# Patient Record
Sex: FEMALE | Race: WHITE | Employment: FULL TIME | URBAN - METROPOLITAN AREA
[De-identification: names, ages, dates, MRNs, and addresses within clinical notes are randomized per-mention and may not be internally consistent; named-entity substitution may affect disease eponyms.]

---

## 2019-09-26 ENCOUNTER — HOSPITAL ENCOUNTER (OUTPATIENT)
Dept: MRI IMAGING | Age: 49
Discharge: HOME | End: 2019-09-26
Payer: COMMERCIAL

## 2019-09-26 DIAGNOSIS — M75.112 INCOMPLETE TEAR OF LEFT ROTATOR CUFF: ICD-10-CM

## 2019-09-26 PROCEDURE — 73221 MRI JOINT UPR EXTREM W/O DYE: CPT

## 2022-07-22 ENCOUNTER — OFFICE VISIT (OUTPATIENT)
Dept: ORTHOPEDIC SURGERY | Age: 52
End: 2022-07-22
Payer: COMMERCIAL

## 2022-07-22 DIAGNOSIS — M19.011 ARTHRITIS OF RIGHT ACROMIOCLAVICULAR JOINT: ICD-10-CM

## 2022-07-22 DIAGNOSIS — G89.29 CHRONIC RIGHT SHOULDER PAIN: ICD-10-CM

## 2022-07-22 DIAGNOSIS — M75.101 TEAR OF RIGHT ROTATOR CUFF, UNSPECIFIED TEAR EXTENT, UNSPECIFIED WHETHER TRAUMATIC: Primary | ICD-10-CM

## 2022-07-22 DIAGNOSIS — M75.41 SHOULDER IMPINGEMENT, RIGHT: ICD-10-CM

## 2022-07-22 DIAGNOSIS — M25.511 CHRONIC RIGHT SHOULDER PAIN: ICD-10-CM

## 2022-07-22 PROBLEM — M25.512 CHRONIC LEFT SHOULDER PAIN: Status: ACTIVE | Noted: 2022-07-22

## 2022-07-22 PROCEDURE — 99204 OFFICE O/P NEW MOD 45 MIN: CPT | Performed by: ORTHOPAEDIC SURGERY

## 2022-07-22 NOTE — PROGRESS NOTES
Macy ALBERT (: 1970) is a 54 y.o. female, patient, here for evaluation of the following chief complaint(s):  Shoulder Pain (right)       HPI:    She began having increased right shoulder and arm pain approximately 2 weeks ago. The patient reports no specific injury and states the pain came on gradually. She describes her discomfort as moderate, sharp, throbbing, aching, and constant. The patient's pain does make it difficult for her to go to sleep and does wake her up from sleep. The patient has been experiencing some swelling and weakness in her shoulder and upper extremity. She states that her pain continues to get worse. She reports that walking and lifting make her pain worse and nothing seems to make her pain better. She has tried prednisone for her discomfort. The patient does report a previous injection in her right shoulder. The patient has been to formal therapy in the past.  She was not seen in the emergency room for her right shoulder or arm pain and reports no previous or related surgical intervention. Right shoulder x-rays from an outside facility were independently reviewed and they show no evidence of a fracture or dislocation. Evidence of impingement and acromioclavicular arthritis. Glenohumeral joint space is maintained. No Known Allergies    No current outpatient medications on file. No current facility-administered medications for this visit. History reviewed. No pertinent past medical history. History reviewed. No pertinent surgical history. History reviewed. No pertinent family history.      Social History     Socioeconomic History    Marital status:      Spouse name: Not on file    Number of children: Not on file    Years of education: Not on file    Highest education level: Not on file   Occupational History    Not on file   Tobacco Use    Smoking status: Not on file    Smokeless tobacco: Not on file   Substance and Sexual Activity Alcohol use: Not on file    Drug use: Not on file    Sexual activity: Not on file   Other Topics Concern    Not on file   Social History Narrative    Not on file     Social Determinants of Health     Financial Resource Strain: Not on file   Food Insecurity: Not on file   Transportation Needs: Not on file   Physical Activity: Not on file   Stress: Not on file   Social Connections: Not on file   Intimate Partner Violence: Not on file   Housing Stability: Not on file       Review of Systems   All other systems reviewed and are negative. Vitals:  Ht 5' 4\" (1.626 m)   Wt 213 lb (96.6 kg)   BMI 36.56 kg/m²    Body mass index is 36.56 kg/m². Ortho Exam     The patient is well-developed and well-nourished. The patient presents today in alert and oriented x3 with a normal mood and affect. The patient stands with a normal weightbearing line and walks with a normal gait. Left shoulder: No shoulder girdle atrophy. There is no soft tissue swelling, ecchymosis, abrasions, or lacerations. Active range of motion of the shoulder is limited to 130 degrees of forward flexion, 120 degrees of lateral abduction, and 70 degrees of external rotation. Internal rotation is to the SI joint and is painful. Passive range of motion is full with a painful impingement sign and painful Boggs sign. Rotator cuff strength is painful to evaluate and is a 4+/5 with forward flexion and lateral abduction. External rotation strength is maintained. There is no crepitation about the joint. Palpation of the [facility] joint does reproduce discomfort and there is pain elicited with cross body abduction. Strength of the extremity is 5/5 strength at biceps/triceps/wrist extension and is comparable to the contralateral side. DTRs are intact at +2/4 and are symmetrical.  Cervical range of motion is full with no pain to palpation along the paraspinal musculature medial border of the scapula. Spurling's sign is negative. ASSESSMENT/PLAN:      1. Tear of right rotator cuff, unspecified tear extent, unspecified whether traumatic  -     MRI SHOULDER RT WO CONT; Future  2. Chronic right shoulder pain  -     MRI SHOULDER RT WO CONT; Future  3. Shoulder impingement, right  4. Arthritis of right acromioclavicular joint     Below is the assessment and plan developed based on review of pertinent history, physical exam, labs, studies, and medications. We discussed the patient's ongoing right shoulder pain and her signs, symptoms, physical exam, description of her pain, and x-rays performed at [facility] that were independently reviewed were consistent with a rotator cuff tear, impingement, and acromioclavicular arthritis. The possible treatment options were discussed with the patient and because of the duration of her increased pain, no improvement with multiple modalities of conservative management including both formal physical therapy and an at-home exercise program, her physical exam, description of her pain, independently reviewed x-rays from an outside facility, and her inability to complete daily living activities without significant discomfort we elected to obtain an MRI of her right shoulder to further evaluate the severity of her rotator cuff tear, impingement, and acromioclavicular arthritis. The MRI images and results will be used in preoperative planning if and almost certainly when surgical intervention is necessary. The risks and benefits of the MRI were discussed in detail with the patient and she would like to proceed. We will schedule this at her convenience. I will see her back after MRI is complete to discuss the images, results, and further treatment options. In the interim, I did encourage her to ice when possible, modify her activity level based on her right shoulder pain, and use anti-inflammatory medication when necessary.   The patient will work on range of motion, strengthening, and stretching exercises with an at-home exercise program as pain tolerates. I will see her back as noted above after right shoulder MRI is complete. **We will obtain an MRI for more information to determine the best treatment plan moving forward and help us prepare for surgical intervention if necessary. **    Return for After her right shoulder MRI is complete. An electronic signature was used to authenticate this note.   -- Deannie Sandhoff, MD

## 2022-07-25 VITALS — WEIGHT: 213 LBS | BODY MASS INDEX: 36.37 KG/M2 | HEIGHT: 64 IN

## 2022-07-30 ENCOUNTER — HOSPITAL ENCOUNTER (EMERGENCY)
Age: 52
Discharge: HOME | End: 2022-07-30
Attending: EMERGENCY MEDICINE
Payer: COMMERCIAL

## 2022-07-30 VITALS
DIASTOLIC BLOOD PRESSURE: 87 MMHG | HEART RATE: 88 BPM | RESPIRATION RATE: 16 BRPM | SYSTOLIC BLOOD PRESSURE: 143 MMHG | HEIGHT: 64 IN | WEIGHT: 213 LBS | OXYGEN SATURATION: 97 % | BODY MASS INDEX: 36.37 KG/M2 | TEMPERATURE: 98.1 F

## 2022-07-30 DIAGNOSIS — L03.211 FACIAL CELLULITIS: Primary | ICD-10-CM

## 2022-07-30 LAB
ALBUMIN SERPL-MCNC: 3.1 G/DL (ref 3.5–5)
ALBUMIN/GLOB SERPL: 0.8 {RATIO} (ref 1.1–2.2)
ALP SERPL-CCNC: 14 U/L (ref 45–117)
ALT SERPL-CCNC: 34 U/L (ref 12–78)
ANION GAP SERPL CALC-SCNC: 8 MMOL/L (ref 5–15)
AST SERPL-CCNC: 34 U/L (ref 15–37)
BASOPHILS # BLD: 0.1 K/UL (ref 0–0.1)
BASOPHILS NFR BLD: 1 % (ref 0–1)
BILIRUB SERPL-MCNC: 0.7 MG/DL (ref 0.2–1)
BUN SERPL-MCNC: 16 MG/DL (ref 6–20)
BUN/CREAT SERPL: 12 (ref 12–20)
CALCIUM SERPL-MCNC: 8.8 MG/DL (ref 8.5–10.1)
CHLORIDE SERPL-SCNC: 110 MMOL/L (ref 97–108)
CO2 SERPL-SCNC: 23 MMOL/L (ref 21–32)
CREAT SERPL-MCNC: 1.36 MG/DL (ref 0.55–1.02)
DIFFERENTIAL METHOD BLD: ABNORMAL
EOSINOPHIL # BLD: 0.1 K/UL (ref 0–0.4)
EOSINOPHIL NFR BLD: 2 % (ref 0–7)
ERYTHROCYTE [DISTWIDTH] IN BLOOD BY AUTOMATED COUNT: 14.5 % (ref 11.5–14.5)
GLOBULIN SER CALC-MCNC: 4 G/DL (ref 2–4)
GLUCOSE SERPL-MCNC: 109 MG/DL (ref 65–100)
HCT VFR BLD AUTO: 41.9 % (ref 35–47)
HGB BLD-MCNC: 13.5 G/DL (ref 11.5–16)
IMM GRANULOCYTES # BLD AUTO: 0.1 K/UL (ref 0–0.04)
IMM GRANULOCYTES NFR BLD AUTO: 1 % (ref 0–0.5)
LYMPHOCYTES # BLD: 1.9 K/UL (ref 0.8–3.5)
LYMPHOCYTES NFR BLD: 32 % (ref 12–49)
MCH RBC QN AUTO: 29.5 PG (ref 26–34)
MCHC RBC AUTO-ENTMCNC: 32.2 G/DL (ref 30–36.5)
MCV RBC AUTO: 91.7 FL (ref 80–99)
MONOCYTES # BLD: 0.8 K/UL (ref 0–1)
MONOCYTES NFR BLD: 13 % (ref 5–13)
NEUTS SEG # BLD: 3.1 K/UL (ref 1.8–8)
NEUTS SEG NFR BLD: 51 % (ref 32–75)
NRBC # BLD: 0 K/UL (ref 0–0.01)
NRBC BLD-RTO: 0 PER 100 WBC
PLATELET # BLD AUTO: 167 K/UL (ref 150–400)
PMV BLD AUTO: 9.2 FL (ref 8.9–12.9)
POTASSIUM SERPL-SCNC: 4 MMOL/L (ref 3.5–5.1)
PROT SERPL-MCNC: 7.1 G/DL (ref 6.4–8.2)
RBC # BLD AUTO: 4.57 M/UL (ref 3.8–5.2)
SODIUM SERPL-SCNC: 141 MMOL/L (ref 136–145)
WBC # BLD AUTO: 6.1 K/UL (ref 3.6–11)

## 2022-07-30 PROCEDURE — 99283 EMERGENCY DEPT VISIT LOW MDM: CPT

## 2022-07-30 PROCEDURE — 36415 COLL VENOUS BLD VENIPUNCTURE: CPT

## 2022-07-30 PROCEDURE — 80053 COMPREHEN METABOLIC PANEL: CPT

## 2022-07-30 PROCEDURE — 85025 COMPLETE CBC W/AUTO DIFF WBC: CPT

## 2022-07-30 NOTE — DISCHARGE INSTRUCTIONS
Continue to take antibiotic as prescribed. Continue to monitor symptoms at home. Take Advil or Tylenol as needed for pain. Follow-up with PCP and return with any changes or worsening.

## 2022-07-30 NOTE — ED TRIAGE NOTES
Pt reports 2 days ago a \"bump\" was coming up on left side of face/cheek with redness and swelling. Was placed on antibiotics yesterday for cellulitis. Feels swelling has gotten worse and came to ER for evaluation for allergic reaction. Pt denies SOB.

## 2022-07-30 NOTE — ED PROVIDER NOTES
53-year-old female with no significant past medical history presents to ED with 2 days of facial redness and swelling. Patient reports that about 3 days ago she had a \"acne-like bump\" on her left cheek that she put toothpaste on and then picked at. The next day she noticed some redness and swelling surrounding this bump. [facility] was diagnosed with cellulitis and put on doxycycline. She has taken 2 doses of this but feels that she is not getting any better and called [facility] back. They encouraged her to go to the ED. She denies any fevers, chills, vision changes, dental pain, otalgia, nasal congestion, dysphagia. The history is provided by the patient. Skin Infection  Pertinent negatives include no chest pain, no headaches and no shortness of breath. No past medical history on file. No past surgical history on file. No family history on file. Social History     Socioeconomic History    Marital status:      Spouse name: Not on file    Number of children: Not on file    Years of education: Not on file    Highest education level: Not on file   Occupational History    Not on file   Tobacco Use    Smoking status: Not on file    Smokeless tobacco: Not on file   Substance and Sexual Activity    Alcohol use: Not on file    Drug use: Not on file    Sexual activity: Not on file   Other Topics Concern    Not on file   Social History Narrative    Not on file     Social Determinants of Health     Financial Resource Strain: Not on file   Food Insecurity: Not on file   Transportation Needs: Not on file   Physical Activity: Not on file   Stress: Not on file   Social Connections: Not on file   Intimate Partner Violence: Not on file   Housing Stability: Not on file         ALLERGIES: Ciprofloxacin, Rocephin [ceftriaxone], and Sulfa (sulfonamide antibiotics)    Review of Systems   Constitutional:  Negative for fever. HENT:  Negative for congestion and sinus pressure. Respiratory:  Negative for shortness of breath. Cardiovascular:  Negative for chest pain. Gastrointestinal:  Negative for nausea and vomiting. Genitourinary:  Negative for dysuria. Musculoskeletal:  Negative for myalgias. Skin:  Positive for color change. Neurological:  Negative for dizziness and headaches. Hematological:  Negative for adenopathy. Psychiatric/Behavioral:  The patient is not nervous/anxious. All other systems reviewed and are negative. Vitals:    07/30/22 1737   BP: (!) 143/87   Pulse: 88   Resp: 16   Temp: 98.1 °F (36.7 °C)   SpO2: 97%   Weight: 96.6 kg (213 lb)   Height: 5' 4\" (1.626 m)            Physical Exam  Vitals and nursing note reviewed. Constitutional:       General: She is not in acute distress. Appearance: Normal appearance. She is normal weight. HENT:      Head: Normocephalic and atraumatic. Eyes:      Extraocular Movements: Extraocular movements intact. Pupils: Pupils are equal, round, and reactive to light. Cardiovascular:      Rate and Rhythm: Normal rate and regular rhythm. Heart sounds: Normal heart sounds. Pulmonary:      Breath sounds: Normal breath sounds. Abdominal:      Palpations: Abdomen is soft. Tenderness: There is no abdominal tenderness. Lymphadenopathy:      Cervical: No cervical adenopathy. Skin:     General: Skin is warm and dry. Findings: Erythema (surrounding wound) and lesion (small wound to left cheek with surrounding erythema and swelling) present. Neurological:      General: No focal deficit present. Mental Status: She is alert and oriented to person, place, and time. Psychiatric:         Mood and Affect: Mood normal.         Behavior: Behavior normal.         Thought Content:  Thought content normal.        MDM  Number of Diagnoses or Management Options  Facial cellulitis  Diagnosis management comments: 53-year-old female with no significant past medical history presents to ED with 2 days of facial redness and swelling. Vital signs stable in triage and patient is afebrile. Physical exam notable for small wound to left cheek with surrounding erythema and swelling consistent with cellulitis. Labs revealed no acute abnormalities and no elevated WBC count. Patient likely has not taken doxycycline long enough to notice a difference. No indication to change antibiotic at this point. Patient will be discharged with instructions to continue taking doxycycline and will be given instructions for conservative care and return precautions.        Amount and/or Complexity of Data Reviewed  Clinical lab tests: reviewed           Procedures

## 2022-08-08 ENCOUNTER — OFFICE VISIT (OUTPATIENT)
Dept: ORTHOPEDIC SURGERY | Age: 52
End: 2022-08-08
Payer: COMMERCIAL

## 2022-08-08 VITALS — HEIGHT: 64 IN | BODY MASS INDEX: 36.56 KG/M2

## 2022-08-08 DIAGNOSIS — M75.31 CALCIFIC TENDINITIS OF RIGHT SHOULDER: ICD-10-CM

## 2022-08-08 DIAGNOSIS — M19.011 ARTHRITIS OF RIGHT ACROMIOCLAVICULAR JOINT: ICD-10-CM

## 2022-08-08 DIAGNOSIS — M75.41 SHOULDER IMPINGEMENT, RIGHT: ICD-10-CM

## 2022-08-08 DIAGNOSIS — M25.511 CHRONIC RIGHT SHOULDER PAIN: Primary | ICD-10-CM

## 2022-08-08 DIAGNOSIS — G89.29 CHRONIC RIGHT SHOULDER PAIN: Primary | ICD-10-CM

## 2022-08-08 DIAGNOSIS — M67.813 BICEPS TENDINOSIS OF RIGHT SHOULDER: ICD-10-CM

## 2022-08-08 PROCEDURE — 99214 OFFICE O/P EST MOD 30 MIN: CPT | Performed by: ORTHOPAEDIC SURGERY

## 2022-08-08 NOTE — PROGRESS NOTES
Macy ALBERT (: 1970) is a 54 y.o. female, patient, here for evaluation of the following chief complaint(s):  Shoulder Pain (Right, MRI results)       HPI:    She was last seen for right shoulder pain on 2022. Since then, the patient did have an MRI performed on her right shoulder on 2022. The patient states that her pain levels gotten worse since her last visit. She rates the severity of her right shoulder pain as a 6 out of 10. She describes her pain as sharp, stabbing, and constant. Her right shoulder pain does make difficult for her to go to sleep and does wake her up from sleep. The patient reports taking no medication for discomfort. Right shoulder MRI images and results were independently reviewed and they were consistent with interval resolution of calcific tendinitis without substantial residual signal abnormality. Mild diffuse rotator cuff tendinopathy. No full-thickness or partial-thickness tendon tear noted. Mild grade 1 tendinosis of the long head of the biceps tendon and rotator interval.  Type II-3 acromion with mild subacromial spurring. No substantial AC joint or glenohumeral joint osteoarthritis noted. Allergies   Allergen Reactions    Ciprofloxacin Itching    Rocephin [Ceftriaxone] Itching    Sulfa (Sulfonamide Antibiotics) Itching       No current outpatient medications on file. No current facility-administered medications for this visit. History reviewed. No pertinent past medical history. History reviewed. No pertinent surgical history. History reviewed. No pertinent family history.      Social History     Socioeconomic History    Marital status:      Spouse name: Not on file    Number of children: Not on file    Years of education: Not on file    Highest education level: Not on file   Occupational History    Not on file   Tobacco Use    Smoking status: Not on file    Smokeless tobacco: Not on file   Substance and Sexual Activity Alcohol use: Not on file    Drug use: Not on file    Sexual activity: Not on file   Other Topics Concern    Not on file   Social History Narrative    Not on file     Social Determinants of Health     Financial Resource Strain: Not on file   Food Insecurity: Not on file   Transportation Needs: Not on file   Physical Activity: Not on file   Stress: Not on file   Social Connections: Not on file   Intimate Partner Violence: Not on file   Housing Stability: Not on file       Review of Systems   All other systems reviewed and are negative. Vitals:  Ht 5' 4\" (1.626 m)   BMI 36.56 kg/m²    Body mass index is 36.56 kg/m². Ortho Exam     The patient is well-developed and well-nourished. The patient presents today in alert and oriented x3 with a normal mood and affect. The patient stands with a normal weightbearing line and walks with a normal gait. Left shoulder: No shoulder girdle atrophy. There is no soft tissue swelling, ecchymosis, abrasions, or lacerations. Active range of motion of the shoulder is limited to 130 degrees of forward flexion, 120 degrees of lateral abduction, and 70 degrees of external rotation. Internal rotation is to the SI joint and is painful. Passive range of motion is full with a painful impingement sign and painful Boggs sign. Rotator cuff strength is painful to evaluate and is a 4+/5 with forward flexion and lateral abduction. External rotation strength is maintained. There is no crepitation about the joint. Palpation of the [facility] joint does reproduce discomfort and there is pain elicited with cross body abduction. Strength of the extremity is 5/5 strength at biceps/triceps/wrist extension and is comparable to the contralateral side. DTRs are intact at +2/4 and are symmetrical.  Cervical range of motion is full with no pain to palpation along the paraspinal musculature medial border of the scapula. Spurling's sign is negative. ASSESSMENT/PLAN:      1.  Chronic right shoulder pain  2. Shoulder impingement, right  3. Arthritis of right acromioclavicular joint  4. Calcific tendinitis of right shoulder  5. Biceps tendinosis of right shoulder       Below is the assessment and plan developed based on review of pertinent history, physical exam, labs, studies, and medications. We discussed the patient's ongoing and worsening right shoulder pain and we independently reviewed her MRI images and results and they were consistent with interval resolution of calcific tendinitis without substantial residual signal abnormality. Mild diffuse rotator cuff tendinopathy. No full-thickness or partial-thickness tendon tear noted. Mild grade 1 tendinosis of the long head of the biceps tendon and rotator interval.  Type II-3 acromion with mild subacromial spurring. No substantial AC joint or glenohumeral joint osteoarthritis noted. Her physical exam is consistent with acromioclavicular joint osteoarthritis. The possible treatment options were discussed with the patient and because of the duration of her increased pain, no improvement with multiple modalities of conservative management including an at-home exercise program, her physical exam, description of her pain, past x-rays, independently reviewed MRI images and results, and her inability to complete daily living activities without significant discomfort we both decided that surgical intervention was the best treatment plan. The risks and benefits of a right shoulder arthroscopic exam with acromioplasty, distal clavicle resection, extensive debridement, and open biceps tenodesis were discussed in detail with the patient and she would like to proceed. We will schedule at her convenience. In the interim, I did encourage her to ice when possible, modify her activity level based on her right shoulder pain, and use anti-inflammatory medication when necessary.   The patient also work on range of motion, strengthening, and stretching exercises with an at-home exercise program as pain tolerates. I will see her back in the office on an as-needed basis or on the day of her upcoming right shoulder surgery. Return for In the office as needed or on the day of her upcoming right shoulder surgery. An electronic signature was used to authenticate this note.   -- Roma Layton MD

## 2022-08-09 DIAGNOSIS — M75.41 SHOULDER IMPINGEMENT, RIGHT: Primary | ICD-10-CM

## 2022-08-09 DIAGNOSIS — M19.011 ARTHRITIS OF RIGHT ACROMIOCLAVICULAR JOINT: ICD-10-CM

## 2022-08-09 DIAGNOSIS — M67.813 BICEPS TENDINOSIS OF RIGHT SHOULDER: ICD-10-CM

## 2022-08-24 ENCOUNTER — DOCUMENTATION ONLY (OUTPATIENT)
Dept: ORTHOPEDIC SURGERY | Age: 52
End: 2022-08-24

## 2022-08-26 DIAGNOSIS — M67.813 BICEPS TENDINOSIS OF RIGHT SHOULDER: ICD-10-CM

## 2022-08-26 DIAGNOSIS — M19.011 ARTHRITIS OF RIGHT ACROMIOCLAVICULAR JOINT: ICD-10-CM

## 2022-08-26 DIAGNOSIS — M75.101 TEAR OF RIGHT ROTATOR CUFF, UNSPECIFIED TEAR EXTENT, UNSPECIFIED WHETHER TRAUMATIC: ICD-10-CM

## 2022-08-26 DIAGNOSIS — M75.41 SHOULDER IMPINGEMENT, RIGHT: Primary | ICD-10-CM

## 2022-08-29 RX ORDER — OXYCODONE AND ACETAMINOPHEN 5; 325 MG/1; MG/1
1 TABLET ORAL
Qty: 40 TABLET | Refills: 0 | Status: SHIPPED | OUTPATIENT
Start: 2022-08-29 | End: 2022-09-05

## 2022-09-06 ENCOUNTER — OFFICE VISIT (OUTPATIENT)
Dept: ORTHOPEDIC SURGERY | Age: 52
End: 2022-09-06
Payer: COMMERCIAL

## 2022-09-06 DIAGNOSIS — M25.511 ACUTE POSTOPERATIVE PAIN OF RIGHT SHOULDER: Primary | ICD-10-CM

## 2022-09-06 DIAGNOSIS — G89.18 ACUTE POSTOPERATIVE PAIN OF RIGHT SHOULDER: Primary | ICD-10-CM

## 2022-09-06 PROCEDURE — 99024 POSTOP FOLLOW-UP VISIT: CPT | Performed by: ORTHOPAEDIC SURGERY

## 2022-09-06 NOTE — PROGRESS NOTES
Macy ALBERT (: 1970) is a 54 y.o. female, patient, here for evaluation of the following chief complaint(s):  Shoulder Pain (right) and Surgical Follow-up (Sx 22)       HPI:  Patient returns for her first postoperative visit following right shoulder arthroscopic extensive debridement, subacromial decompression, distal clavicle excision, and open biceps tenodesis. Her surgery was performed on 2022. She currently patient rates her pain as 4 out of 10. Describes it as throbbing and comes and goes and wakes her up from sleep. She is taking Percocet postoperatively as needed. Overall doing well and no major issues since surgery. Allergies   Allergen Reactions    Ciprofloxacin Itching    Rocephin [Ceftriaxone] Itching    Sulfa (Sulfonamide Antibiotics) Itching       No current outpatient medications on file. No current facility-administered medications for this visit. No past medical history on file. No past surgical history on file. No family history on file. Social History     Socioeconomic History    Marital status:      Spouse name: Not on file    Number of children: Not on file    Years of education: Not on file    Highest education level: Not on file   Occupational History    Not on file   Tobacco Use    Smoking status: Not on file    Smokeless tobacco: Not on file   Substance and Sexual Activity    Alcohol use: Not on file    Drug use: Not on file    Sexual activity: Not on file   Other Topics Concern    Not on file   Social History Narrative    Not on file     Social Determinants of Health     Financial Resource Strain: Not on file   Food Insecurity: Not on file   Transportation Needs: Not on file   Physical Activity: Not on file   Stress: Not on file   Social Connections: Not on file   Intimate Partner Violence: Not on file   Housing Stability: Not on file       Review of Systems    Vitals: There were no vitals taken for this visit.    There is no height or weight on file to calculate BMI. Ortho Exam     The patient is well-developed and well-nourished. The patient presents today in alert and oriented x3 with a normal mood and affect. The patient stands with a normal weightbearing line and walks with a normal gait. ASSESSMENT/PLAN:      1. Acute postoperative pain of right shoulder     Below is the assessment and plan developed based on review of pertinent history, physical exam, labs, studies, and medications. The patient sutures removed today in office under sterile conditions. We taught her home exercises to work on range of motion passively. She will return the office in 3 weeks to assess her range of motion progress if she is not progressing well we will initiate formalized physical therapy at this time. Return in about 3 weeks (around 9/27/2022). An electronic signature was used to authenticate this note.   -- Aleja Ruiz MD

## 2022-09-27 ENCOUNTER — OFFICE VISIT (OUTPATIENT)
Dept: ORTHOPEDIC SURGERY | Age: 52
End: 2022-09-27
Payer: COMMERCIAL

## 2022-09-27 VITALS — HEIGHT: 64 IN | BODY MASS INDEX: 36.37 KG/M2 | WEIGHT: 213 LBS

## 2022-09-27 DIAGNOSIS — Z98.890 STATUS POST SHOULDER SURGERY: ICD-10-CM

## 2022-09-27 DIAGNOSIS — M25.511 PAIN OF RIGHT SHOULDER REGION: Primary | ICD-10-CM

## 2022-09-27 PROCEDURE — 99024 POSTOP FOLLOW-UP VISIT: CPT | Performed by: ORTHOPAEDIC SURGERY

## 2022-09-27 NOTE — PROGRESS NOTES
Macy ALBERT (: 1970) is a 54 y.o. female, patient, here for evaluation of the following chief complaint(s):  Shoulder Pain (Right ) and Surgical Follow-up (Sx 22)       HPI:    She is now approximately 4 weeks status post right shoulder arthroscopic extensive debridement, subacromial decompression, distal clavicle excision, and open biceps tenodesis. Her surgery was performed on 2022. He was last seen on 2022. The patient states that her pain level is improved since her last visit and surgical procedure. She rates the severity of her postoperative right shoulder pain is a 3 out of 10. She describes her pain as throbbing and intermittent. Her postoperative right shoulder pain does still occasionally wake her up from sleep at night. She has been taking narcotic pain medication for her discomfort at night to help her sleep. The patient has been working on range of motion, strengthening, and stretching exercises post-operatively. Allergies   Allergen Reactions    Ciprofloxacin Itching    Rocephin [Ceftriaxone] Itching    Sulfa (Sulfonamide Antibiotics) Itching       No current outpatient medications on file. No current facility-administered medications for this visit. History reviewed. No pertinent past medical history. History reviewed. No pertinent surgical history. History reviewed. No pertinent family history.      Social History     Socioeconomic History    Marital status:      Spouse name: Not on file    Number of children: Not on file    Years of education: Not on file    Highest education level: Not on file   Occupational History    Not on file   Tobacco Use    Smoking status: Never    Smokeless tobacco: Never   Substance and Sexual Activity    Alcohol use: Not on file    Drug use: Not on file    Sexual activity: Not on file   Other Topics Concern    Not on file   Social History Narrative    Not on file     Social Determinants of Health Financial Resource Strain: Not on file   Food Insecurity: Not on file   Transportation Needs: Not on file   Physical Activity: Not on file   Stress: Not on file   Social Connections: Not on file   Intimate Partner Violence: Not on file   Housing Stability: Not on file       Review of Systems   All other systems reviewed and are negative. Vitals:  Ht 5' 4\" (1.626 m)   Wt 213 lb (96.6 kg)   BMI 36.56 kg/m²    Body mass index is 36.56 kg/m². Ortho Exam     The patient is well-developed and well-nourished. The patient presents today in alert and oriented x3 with a normal mood and affect. The patient stands with a normal weightbearing line and walks with a normal gait. Right shoulder wounds clean and dry neurovascular intact. There is no ecchymosis or erythema. Her incisions are well-healed with no sign of irritation or infection and look normal.  She does have full elbow and wrist range of motion. Her shoulder range of motion has improved since her last visit. She has approximately 130 degrees of forward flexion and 80 degrees of abduction. Her strength is also improving. There still limitation in all planes of motion secondary to her postoperative discomfort. There is weakness noted compared to normal.  Her sensations are intact and pulses are 2+. Her skin is well-healed. ASSESSMENT/PLAN:      1. Pain of right shoulder region  2. Status post shoulder surgery  -     REFERRAL TO PHYSICAL THERAPY     Below is the assessment and plan developed based on review of pertinent history, physical exam, labs, studies, and medications. **The patient was referred to formal physical therapy. **    We discussed the patient's right shoulder arthroscopic extensive debridement, subacromial decompression, distal clavicle excision, and open biceps tenodesis. Her surgery was performed on 8/29/2022. She is now approximately 4 weeks status postsurgical intervention.   The patient continues to progress nicely in her recovery. Her pain is intermittent and well controlled. Her range of motion and strength both continue to improve. The patient will continue the postoperative protocol by being on range of motion, strengthening, and stretching exercises with both formal physical therapy and with an at-home exercise program as pain tolerates. She will continue to increase activities as tolerated and as discussed today in the office. She is still to be cautious with any significant power biceps activities, internal and external supination, and reaching behind her with her arm fully extended. I did encourage her to ice when possible, modify her activity level based on her postoperative right shoulder and arm pain, and use anti-inflammatory medication when necessary. I will see her back in 3 weeks for reevaluation and further discussion of the postoperative protocol. Return in about 3 weeks (around 10/18/2022) for Re-evaluation and further discussion of the postop protocol. An electronic signature was used to authenticate this note.   -- Roma Layton MD

## 2022-11-07 ENCOUNTER — OFFICE VISIT (OUTPATIENT)
Dept: ORTHOPEDIC SURGERY | Age: 52
End: 2022-11-07
Payer: COMMERCIAL

## 2022-11-07 DIAGNOSIS — M25.511 ACUTE POSTOPERATIVE PAIN OF RIGHT SHOULDER: Primary | ICD-10-CM

## 2022-11-07 DIAGNOSIS — M25.511 PAIN OF RIGHT SHOULDER REGION: ICD-10-CM

## 2022-11-07 DIAGNOSIS — G89.18 ACUTE POSTOPERATIVE PAIN OF RIGHT SHOULDER: Primary | ICD-10-CM

## 2022-11-07 DIAGNOSIS — Z98.890 STATUS POST SHOULDER SURGERY: ICD-10-CM

## 2022-11-07 PROCEDURE — 99024 POSTOP FOLLOW-UP VISIT: CPT | Performed by: ORTHOPAEDIC SURGERY

## 2022-11-07 RX ORDER — METHYLPREDNISOLONE 4 MG/1
TABLET ORAL
Qty: 1 DOSE PACK | Refills: 0 | Status: SHIPPED | OUTPATIENT
Start: 2022-11-07

## 2022-11-07 NOTE — PROGRESS NOTES
Macy ALBERT (: 1970) is a 50 y.o. female, patient, here for evaluation of the following chief complaint(s):  Shoulder Pain (right) and Surgical Follow-up (Sx 22)       HPI:    She is now approximately 10 weeks status post right shoulder arthroscopic extensive debridement, subacromial decompression, distal clavicle excision, and open biceps tenodesis. Her surgery was performed on 2022. She was last seen on 2022. The patient states that her pain level has gotten slightly worse since her last visit. She does report that she was doing very nicely postoperatively but her dog yanked on the leash and she has had increased discomfort since then. She rates the severity of her postoperative right shoulder pain as a 5 out of 10. She describes her pain as stabbing and constant. Her postoperative right shoulder pain does still wake her up from sleep at night. The patient has been taking Percocet for her discomfort as needed. She has been attending formal PT postoperatively. The patient has also been working on these exercises with an at-home exercise program.  She reports that the formal PT, home exercises, and medication have all helped reduce her discomfort. Allergies   Allergen Reactions    Ciprofloxacin Itching    Rocephin [Ceftriaxone] Itching    Sulfa (Sulfonamide Antibiotics) Itching       Current Outpatient Medications   Medication Sig    methylPREDNISolone (MEDROL DOSEPACK) 4 mg tablet Per dose pack instructions     No current facility-administered medications for this visit. History reviewed. No pertinent past medical history. History reviewed. No pertinent surgical history. History reviewed. No pertinent family history.      Social History     Socioeconomic History    Marital status:      Spouse name: Not on file    Number of children: Not on file    Years of education: Not on file    Highest education level: Not on file   Occupational History    Not on file   Tobacco Use    Smoking status: Never    Smokeless tobacco: Never   Substance and Sexual Activity    Alcohol use: Not on file    Drug use: Not on file    Sexual activity: Not on file   Other Topics Concern    Not on file   Social History Narrative    Not on file     Social Determinants of Health     Financial Resource Strain: Not on file   Food Insecurity: Not on file   Transportation Needs: Not on file   Physical Activity: Not on file   Stress: Not on file   Social Connections: Not on file   Intimate Partner Violence: Not on file   Housing Stability: Not on file       Review of Systems   All other systems reviewed and are negative. Vitals: There were no vitals taken for this visit. There is no height or weight on file to calculate BMI. Ortho Exam     The patient is well-developed and well-nourished. The patient presents today in alert and oriented x3 with a normal mood and affect. The patient stands with a normal weightbearing line and walks with a normal gait. Right shoulder wounds clean and dry neurovascular intact. There is no ecchymosis or erythema. Her incisions are well-healed with no sign of irritation or infection and look normal.  She does have full elbow and wrist range of motion. Her shoulder range of motion has been improving very nicely but after the dog yanked the leash her range of motion has decreased slightly. She has approximately 130 degrees of active forward flexion and 90 degrees of active abduction. There is limitation in all planes of motion secondary to her discomfort. Her strength continues to improve. There is weakness noted compared to normal.  Her sensations are intact and pulses are 2+. His skin is well-healed. ASSESSMENT/PLAN:      1. Acute postoperative pain of right shoulder  -     methylPREDNISolone (MEDROL DOSEPACK) 4 mg tablet; Per dose pack instructions, Normal, Disp-1 Dose Pack, R-0  2. Pain of right shoulder region  3.  Status post shoulder surgery  -     REFERRAL TO PHYSICAL THERAPY       Below is the assessment and plan developed based on review of pertinent history, physical exam, labs, studies, and medications. **The patient will continue attending formal physical therapy. **    We discussed the patient's right shoulder arthroscopic extensive debridement, subacromial decompression, distal clavicle excision, and open biceps tenodesis. Her surgery was performed on 8/29/2022. She is now approximately 10 weeks status postsurgical intervention. The patient has been progressing very nicely in her recovery but her dog yanked on the leash and she has had increased discomfort since then. The possible treatment options were discussed with the patient and we elected to treat her pain with rest, ice, activity modification, and a Medrol Dosepak. The patient was given a prescription for Medrol Dosepak which she will use as instructed. While taking her Medrol Dosepak, the patient will discontinue use of anti-inflammatory medication. Once her Medrol Dosepak is complete, the patient may resume anti-inflammatories at that time. She will also continue the postoperative protocol by working on range of motion, strengthening, and stretching exercises at both formal physical therapy and with an at-home exercise program as pain tolerates. She may continue to increase activities as tolerated and as discussed today in the office. The patient was given documentation to return to work 11/21/2022. She is to work 4 hours a day that week. She is to do no lifting over 20 pounds and no overhead work. I will see her back in 3 weeks for reevaluation and further discussion of the postoperative protocol and her work status and restrictions. Return in about 3 weeks (around 11/28/2022) for Reevaluation and further discussion of the postop protocol/her work status and restrictions. An electronic signature was used to authenticate this note.   -- Deniz Mcdaniel MD

## 2022-11-07 NOTE — LETTER
11/7/2022 10:24 AM    Ms. Sean Kiki [address] [facility] [id number]      To whom to may concern:     Gunner Darnell was seen in our office on 11/7/2022. Status and/or Restrictions    She  may return to work light duty beginning 11/21/2022    With the following restrictions:   No lifting above 20 pounds   No overhead work      Feel free to contact my office at [phone] if you have any questions.            Sincerely,        Joycelyn Wilder MD

## 2022-11-11 DIAGNOSIS — M25.511 ACUTE POSTOPERATIVE PAIN OF RIGHT SHOULDER: ICD-10-CM

## 2022-11-11 DIAGNOSIS — G89.18 ACUTE POSTOPERATIVE PAIN OF RIGHT SHOULDER: ICD-10-CM

## 2022-11-11 DIAGNOSIS — M75.41 SHOULDER IMPINGEMENT, RIGHT: Primary | ICD-10-CM

## 2022-11-11 RX ORDER — GABAPENTIN 300 MG/1
300 CAPSULE ORAL
Qty: 30 CAPSULE | Refills: 0 | Status: SHIPPED | OUTPATIENT
Start: 2022-11-11 | End: 2022-11-11 | Stop reason: CLARIF

## 2022-11-14 RX ORDER — GABAPENTIN 300 MG/1
300 CAPSULE ORAL
Qty: 30 CAPSULE | Refills: 0 | Status: SHIPPED | OUTPATIENT
Start: 2022-11-14

## 2022-11-16 ENCOUNTER — DOCUMENTATION ONLY (OUTPATIENT)
Dept: ORTHOPEDIC SURGERY | Age: 52
End: 2022-11-16

## 2022-11-28 ENCOUNTER — OFFICE VISIT (OUTPATIENT)
Dept: ORTHOPEDIC SURGERY | Age: 52
End: 2022-11-28
Payer: COMMERCIAL

## 2022-11-28 VITALS — HEIGHT: 63 IN | WEIGHT: 209 LBS | BODY MASS INDEX: 37.03 KG/M2

## 2022-11-28 DIAGNOSIS — Z98.890 STATUS POST SHOULDER SURGERY: ICD-10-CM

## 2022-11-28 DIAGNOSIS — M25.511 PAIN OF RIGHT SHOULDER REGION: Primary | ICD-10-CM

## 2022-11-28 PROCEDURE — 99024 POSTOP FOLLOW-UP VISIT: CPT | Performed by: ORTHOPAEDIC SURGERY

## 2022-11-28 NOTE — PROGRESS NOTES
Macy ALBERT (: 1970) is a 50 y.o. female, patient, here for evaluation of the following chief complaint(s):  Shoulder Pain (right) and Surgical Follow-up (Sx 2022)       HPI:    She is now approximately 13-week status post right shoulder arthroscopic extensive debridement, subacromial decompression, distal clavicle excision, and open biceps tenodesis. Her surgery was performed on 2022. She was last seen on 2022. The patient states that her pain level is the same as was her last visit. She rates the severity of her postoperative right shoulder pain is a 4 out of 10. She describes her pain as sharp and constant. Her postoperative right shoulder pain does make it difficult for her to go to sleep and does wake her up from sleep. The patient has been attending formal physical therapy postoperatively. She is also been working on these exercises with an at-home exercise program.  She reports that both the formal PT and home exercises have helped reduce her postoperative right shoulder pain. Allergies   Allergen Reactions    Ciprofloxacin Itching    Rocephin [Ceftriaxone] Itching    Sulfa (Sulfonamide Antibiotics) Itching       Current Outpatient Medications   Medication Sig    gabapentin (Neurontin) 300 mg capsule Take 1 Capsule by mouth nightly as needed for Pain. Max Daily Amount: 300 mg. Daily at Bedtime  Indications: acute pain following an operation    methylPREDNISolone (MEDROL DOSEPACK) 4 mg tablet Per dose pack instructions     No current facility-administered medications for this visit. History reviewed. No pertinent past medical history. History reviewed. No pertinent surgical history. History reviewed. No pertinent family history.      Social History     Socioeconomic History    Marital status:      Spouse name: Not on file    Number of children: Not on file    Years of education: Not on file    Highest education level: Not on file   Occupational History    Not on file   Tobacco Use    Smoking status: Never    Smokeless tobacco: Never   Substance and Sexual Activity    Alcohol use: Not on file    Drug use: Not on file    Sexual activity: Not on file   Other Topics Concern    Not on file   Social History Narrative    Not on file     Social Determinants of Health     Financial Resource Strain: Not on file   Food Insecurity: Not on file   Transportation Needs: Not on file   Physical Activity: Not on file   Stress: Not on file   Social Connections: Not on file   Intimate Partner Violence: Not on file   Housing Stability: Not on file       Review of Systems   All other systems reviewed and are negative. Vitals:  Ht 5' 3\" (1.6 m)   Wt 209 lb (94.8 kg)   BMI 37.02 kg/m²    Body mass index is 37.02 kg/m². Ortho Exam     The patient is well-developed and well-nourished. The patient presents today in alert and oriented x3 with a normal mood and affect. The patient stands with a normal weightbearing line and walks with a normal gait. Right shoulder wounds clean and dry neurovascular intact. There is no ecchymosis or erythema. Her incisions are well-healed with no sign of irritation or infection and look normal.  She does have full elbow and wrist range of motion. Her shoulder range of motion continues to improve. She has approximately 140 degrees of active forward flexion and 90 degrees of active abduction. She is still slightly stiffer than expected at this point in her recovery. Her strength continues to improve. There is still weakness noted compared to normal.  Her sensations are intact and pulses are 2+. Her skin is well-healed. ASSESSMENT/PLAN:      1. Pain of right shoulder region  2. Status post shoulder surgery  -     REFERRAL TO PHYSICAL THERAPY     Below is the assessment and plan developed based on review of pertinent history, physical exam, labs, studies, and medications.     **The patient will continue attending formal physical therapy. **    We discussed the patient's right shoulder arthroscopic extensive debridement, subacromial decompression, distal clavicle excision, and open biceps tenodesis. Her surgery was performed on 8/29/2022. She is now approximately 13 weeks status postsurgical intervention. The patient continues to progress in her recovery. Her pain is intermittent and well controlled. Her range of motion and strength both continue to improve. The patient will continue the postoperative protocol by working on range of motion, strengthening, and stretching exercises at both formal physical therapy and with an at-home exercise program as pain tolerates. She may continue to increase activities as tolerated and as discussed today in the office. I did encourage her to continue to ice when possible, modify her activity level based on her postoperative right shoulder pain, and use anti-inflammatory medication when necessary. I will see her back in 3 to 4 weeks for reevaluation and further discussion of the postoperative protocol. Return in about 3 weeks (around 12/19/2022) for Re-evaluation and further discussion of the postop protocol. An electronic signature was used to authenticate this note.   -- Ramsey Abbott MD

## 2023-02-22 ENCOUNTER — OFFICE VISIT (OUTPATIENT)
Dept: ORTHOPEDIC SURGERY | Age: 53
End: 2023-02-22
Payer: COMMERCIAL

## 2023-02-22 VITALS — HEIGHT: 63 IN | BODY MASS INDEX: 37.03 KG/M2 | WEIGHT: 209 LBS

## 2023-02-22 DIAGNOSIS — S92.022A CLOSED DISPLACED FRACTURE OF ANTERIOR PROCESS OF LEFT CALCANEUS, INITIAL ENCOUNTER: Primary | ICD-10-CM

## 2023-02-22 DIAGNOSIS — S99.922A FOOT INJURY, LEFT, INITIAL ENCOUNTER: ICD-10-CM

## 2023-02-22 RX ORDER — FLUOXETINE HYDROCHLORIDE 40 MG/1
CAPSULE ORAL
COMMUNITY

## 2023-02-22 NOTE — PROGRESS NOTES
Macy Ramirez (: 1970) is a 50 y.o. female, patient,here for evaluation of the following   Chief Complaint   Patient presents with    Foot Injury        ASSESSMENT/PLAN:  Below is the assessment and plan developed based on review of pertinent history, physical exam, labs, studies, and medications. 1. Closed displaced fracture of anterior process of left calcaneus, initial encounter  2. Foot injury, left, initial encounter    Patient verbalized understanding of exam findings and treatment plan. We engaged in the shared decision-making process and treatment options were discussed at length with the patient. Surgical and conservative management discussed today along with risk and benefits. Patient is informed of findings on exam and x-rays reviewed. Her fracture is at the distal anterior lateral part of the calcaneus and near the calcaneocuboid joint where there are avulsion fractures off the lateral cuboid and calcaneus. The joint is intact without dislocation or subluxation. No other fractures or dislocations seen. This should heal without any further problems, she will continue immobilization in the boot currently wearing. She can limit her weightbearing for the next 2 to 4 weeks to allow this to further heal and progress to full weightbearing as tolerated thereafter. Next time she returns we will get new x-rays of the right foot 3 views weightbearing. I discussed management options with the patient. All questions were answered to the best of my ability and to the patient's satisfaction. I discussed their history, symptoms, physical exam findings, diagnostic testing results, diagnosis and treatment options. The patient verbalized understanding and elected to proceed with conservative treatment as above. Return in about 6 weeks (around 2023) for repeat xrays.       Allergies   Allergen Reactions    Ciprofloxacin Itching    Rocephin [Ceftriaxone] Itching    Sulfa (Sulfonamide Antibiotics) Itching       Current Outpatient Medications   Medication Sig    FLUoxetine (PROzac) 40 mg capsule Prozac 40 mg capsule   1 tab PO daily-    gabapentin (Neurontin) 300 mg capsule Take 1 Capsule by mouth nightly as needed for Pain. Max Daily Amount: 300 mg. Daily at Bedtime  Indications: acute pain following an operation     No current facility-administered medications for this visit. No past medical history on file. No past surgical history on file. No family history on file. Social History     Socioeconomic History    Marital status:      Spouse name: Not on file    Number of children: Not on file    Years of education: Not on file    Highest education level: Not on file   Occupational History    Not on file   Tobacco Use    Smoking status: Never    Smokeless tobacco: Never   Substance and Sexual Activity    Alcohol use: Not on file    Drug use: Not on file    Sexual activity: Not on file   Other Topics Concern    Not on file   Social History Narrative    Not on file     Social Determinants of Health     Financial Resource Strain: Not on file   Food Insecurity: Not on file   Transportation Needs: Not on file   Physical Activity: Not on file   Stress: Not on file   Social Connections: Not on file   Intimate Partner Violence: Not on file   Housing Stability: Not on file           Vitals:  Ht 5' 3\" (1.6 m)   Wt 209 lb (94.8 kg)   BMI 37.02 kg/m²    Body mass index is 37.02 kg/m². SUBJECTIVE:  Macy Ramirez (: 1970)   Former patient presents today with new problem, with complaint of left foot pain since 2 days ago on 2023 she fell after her foot fell asleep and twisted the foot. She had severe pain stabbing and throbbing and is constant and interferes with sleep. There is swelling and bruising and weakness present. Standing walking make it worse.   She has tried rest and Aleve and elevation and [facility] where she was told she had a fracture and referred to orthopedics. She is not diabetic, non-smoker. In the past, she has had surgical procedure at the right foot which I performed and that has healed and she is not having any trouble at the right foot. OBJECTIVE EXAM:     Visit Vitals  Ht 5' 3\" (1.6 m)   Wt 209 lb (94.8 kg)   BMI 37.02 kg/m²       Appearance: Alert, well appearing and pleasant patient who is in no distress, oriented to person, place/time, and who follows commands. This patient is accompanied in the       office by her  self. Psychiatric: Affect and mood are appropriate. No dementia noted on examination  Musculoskeletal:  LOCATION: Tenderness along the lateral calcaneocuboid joint area and anterior process foot - left      Integumentary: No rashes, skin patches, wounds, or abrasions to the right or left legs       Warm and normal color. No regions of expressible drainage. Gait: Normal      Tenderness: No tenderness        Motor/Strength/Tone Exam: Normal       Sensory Exam:   Intact Normal Sensation to ankle/foot      Stability Testing: No anterolateral or varus instability of the Ankle or Subtalar Joints               No peroneal tendon instability noted      ROM: Normal ROM noted to ankle/foot      Contractures: No Achilles or Gastrocnemius Contractures      Calf tenderness: Absent for calf or gastrocnemius muscle regions       Soft, supple, non tender, non taut lower extremity compartment  Alignment:      NEUTRAL Hindfoot,         none Metatarsus Adductus Metatarsus   Wounds/Abrasions:    None present  Extremities:   No embolic phenomena to the toes          No significant edema to the foot and or toes.         Lower extremities are warm and appear well perfused    DVT: No evidence of DVT seen on examination at this time     No calf swelling, no tenderness to calf muscles  Lymphatic:  No Evidence of Lymphedema  Vascular: Medial Border of Tibia Region: Edema is not present         Pulses: Dorsalis Pedis &  Posterior Tibial Pulses : Palpable yes        Varicosities Lower Limbs :  None  noted  Neuro: Negative bilateral Straight leg raise (seated position)    See Musculoskeletal section for pertinent individual extremity examination    No abnormal hand/wrist, foot/ankle, or facial/neck tremors. Lower Extremity/Ankle/Foot:  Antalgic gait, limited weightbearing stance. Left lower extremity/ankle: There is full active and passive range of motion intact, mostly nontender, no swelling, ligaments grossly stable. Achilles tendon intact with negative Stern test, negative ankle squeeze test.  Range of motion and strength similar to contralateral lower extremity. Left foot: There is tenderness along the lateral hindfoot at the distal part of calcaneus towards the anterior process and also at the CC joint, there is mild swelling and resolving ecchymosis. The calcaneal tuberosity at posterior is nontender, the sinus tarsi is nontender, the midfoot Lisfranc joint, navicular, cuboid nontender, forefoot metatarsals including base of fifth metatarsal nontender. Able to flex and extend toes satisfactory to motion and strength. Contralateral lower extremity: Skin intact without erythema or wounds. 2+ dorsalis pedis pulse. Toes are warm, and well-perfused. Sensation is intact to light touch in the DP, SP, sural, saphenous, and tibial nerve distributions. 5/5 strength in ankle dorsiflexion, plantarflexion, inversion, and eversion. Ankle range of motion is 10 degrees of dorsiflexion to 40 degrees of plantarflexion. Smooth and painless hindfoot and midfoot range of motion. No severe hallux, lesser toe malalignment or deformities, no pain with passive motion of lesser MTP joints, hallux MTP joint, no first TMT instability. Previous surgical incision well-healed. Neurovascular exam grossly intact similar to contralateral lower extremity.   2+ dorsalis pedis pulse, EHL/FHL 5/5.    Imaging:    Reviewed the x-rays of the left foot AP, lateral and [facility], these films were transitioned to PACS, the ankle x-rays do not show an obvious fracture dislocation at the ankle, there is normal ankle mortise and joint space. Satisfactory bone density. There are also x-rays of the left foot on same day, they are of the left foot AP, lateral and oblique nonweightbearing x-rays, shows avulsion fractures near the calcaneocuboid joint avulsion of the calcaneus near the joint and there is one slightly proximal at the lateral calcaneus. Calcaneal tuberosity is otherwise intact noted on lateral view. No other fractures at the calcaneus, or rest of foot. An electronic signature was used to authenticate this note.   -- Miladis Syed MD

## 2023-05-03 ENCOUNTER — OFFICE VISIT (OUTPATIENT)
Dept: ORTHOPEDIC SURGERY | Age: 53
End: 2023-05-03

## 2023-05-03 VITALS — WEIGHT: 209 LBS | BODY MASS INDEX: 37.03 KG/M2 | HEIGHT: 63 IN

## 2023-05-03 DIAGNOSIS — S92.022D CLOSED DISPLACED FRACTURE OF ANTERIOR PROCESS OF LEFT CALCANEUS WITH ROUTINE HEALING, SUBSEQUENT ENCOUNTER: Primary | ICD-10-CM

## 2023-05-03 DIAGNOSIS — S97.82XD CRUSH INJURY OF LEFT FOOT, SUBSEQUENT ENCOUNTER: ICD-10-CM

## 2023-09-05 ENCOUNTER — APPOINTMENT (OUTPATIENT)
Facility: HOSPITAL | Age: 53
End: 2023-09-05

## 2023-09-05 ENCOUNTER — HOSPITAL ENCOUNTER (INPATIENT)
Facility: HOSPITAL | Age: 53
LOS: 2 days | Discharge: HOME | End: 2023-09-08
Attending: EMERGENCY MEDICINE | Admitting: INTERNAL MEDICINE

## 2023-09-05 DIAGNOSIS — R00.0 TACHYCARDIA: ICD-10-CM

## 2023-09-05 DIAGNOSIS — R79.89 ELEVATED BRAIN NATRIURETIC PEPTIDE (BNP) LEVEL: ICD-10-CM

## 2023-09-05 DIAGNOSIS — R06.02 SHORTNESS OF BREATH: Primary | ICD-10-CM

## 2023-09-05 DIAGNOSIS — I31.39 PERICARDIAL EFFUSION: ICD-10-CM

## 2023-09-05 DIAGNOSIS — R77.8 ELEVATED TROPONIN: ICD-10-CM

## 2023-09-05 DIAGNOSIS — R79.89 ELEVATED D-DIMER: ICD-10-CM

## 2023-09-05 DIAGNOSIS — J90 PLEURAL EFFUSION: ICD-10-CM

## 2023-09-05 DIAGNOSIS — I50.9 ACUTE CONGESTIVE HEART FAILURE, UNSPECIFIED HEART FAILURE TYPE (HCC): ICD-10-CM

## 2023-09-05 DIAGNOSIS — J81.0 ACUTE PULMONARY EDEMA (HCC): ICD-10-CM

## 2023-09-05 LAB
ALBUMIN SERPL-MCNC: 3.6 G/DL (ref 3.5–5)
ALBUMIN/GLOB SERPL: 1 (ref 1.1–2.2)
ALP SERPL-CCNC: 12 U/L (ref 45–117)
ALT SERPL-CCNC: 23 U/L (ref 12–78)
ANION GAP SERPL CALC-SCNC: 10 MMOL/L (ref 5–15)
APPEARANCE UR: CLEAR
AST SERPL-CCNC: 23 U/L (ref 15–37)
BACTERIA URNS QL MICRO: NEGATIVE /HPF
BASOPHILS # BLD: 0.1 K/UL (ref 0–0.1)
BASOPHILS NFR BLD: 1 % (ref 0–1)
BILIRUB SERPL-MCNC: 1.5 MG/DL (ref 0.2–1)
BILIRUB UR QL: NEGATIVE
BUN SERPL-MCNC: 14 MG/DL (ref 6–20)
BUN/CREAT SERPL: 10 (ref 12–20)
CALCIUM SERPL-MCNC: 9.6 MG/DL (ref 8.5–10.1)
CHLORIDE SERPL-SCNC: 112 MMOL/L (ref 97–108)
CO2 SERPL-SCNC: 20 MMOL/L (ref 21–32)
COLOR UR: ABNORMAL
CREAT SERPL-MCNC: 1.45 MG/DL (ref 0.55–1.02)
D DIMER PPP FEU-MCNC: 1.54 MG/L FEU (ref 0–0.65)
DIFFERENTIAL METHOD BLD: ABNORMAL
EOSINOPHIL # BLD: 0.1 K/UL (ref 0–0.4)
EOSINOPHIL NFR BLD: 1 % (ref 0–7)
EPITH CASTS URNS QL MICRO: ABNORMAL /LPF
ERYTHROCYTE [DISTWIDTH] IN BLOOD BY AUTOMATED COUNT: 14.6 % (ref 11.5–14.5)
GLOBULIN SER CALC-MCNC: 3.7 G/DL (ref 2–4)
GLUCOSE SERPL-MCNC: 107 MG/DL (ref 65–100)
GLUCOSE UR STRIP.AUTO-MCNC: NEGATIVE MG/DL
HCT VFR BLD AUTO: 39.7 % (ref 35–47)
HGB BLD-MCNC: 12.6 G/DL (ref 11.5–16)
HGB UR QL STRIP: NEGATIVE
HYALINE CASTS URNS QL MICRO: ABNORMAL /LPF (ref 0–2)
IMM GRANULOCYTES # BLD AUTO: 0 K/UL (ref 0–0.04)
IMM GRANULOCYTES NFR BLD AUTO: 0 % (ref 0–0.5)
KETONES UR QL STRIP.AUTO: NEGATIVE MG/DL
LEUKOCYTE ESTERASE UR QL STRIP.AUTO: ABNORMAL
LYMPHOCYTES # BLD: 2.4 K/UL (ref 0.8–3.5)
LYMPHOCYTES NFR BLD: 21 % (ref 12–49)
MAGNESIUM SERPL-MCNC: 2 MG/DL (ref 1.6–2.4)
MCH RBC QN AUTO: 29.1 PG (ref 26–34)
MCHC RBC AUTO-ENTMCNC: 31.7 G/DL (ref 30–36.5)
MCV RBC AUTO: 91.7 FL (ref 80–99)
MONOCYTES # BLD: 0.6 K/UL (ref 0–1)
MONOCYTES NFR BLD: 5 % (ref 5–13)
NEUTS SEG # BLD: 8.1 K/UL (ref 1.8–8)
NEUTS SEG NFR BLD: 72 % (ref 32–75)
NITRITE UR QL STRIP.AUTO: NEGATIVE
NRBC # BLD: 0 K/UL (ref 0–0.01)
NRBC BLD-RTO: 0 PER 100 WBC
NT PRO BNP: 8449 PG/ML
PH UR STRIP: 5 (ref 5–8)
PLATELET # BLD AUTO: 295 K/UL (ref 150–400)
PMV BLD AUTO: 10.3 FL (ref 8.9–12.9)
POTASSIUM SERPL-SCNC: 3.6 MMOL/L (ref 3.5–5.1)
PROT SERPL-MCNC: 7.3 G/DL (ref 6.4–8.2)
PROT UR STRIP-MCNC: NEGATIVE MG/DL
RBC # BLD AUTO: 4.33 M/UL (ref 3.8–5.2)
RBC #/AREA URNS HPF: ABNORMAL /HPF (ref 0–5)
SODIUM SERPL-SCNC: 142 MMOL/L (ref 136–145)
SP GR UR REFRACTOMETRY: 1.02 (ref 1–1.03)
TROPONIN I SERPL HS-MCNC: 40 NG/L (ref 0–51)
UROBILINOGEN UR QL STRIP.AUTO: 0.2 EU/DL (ref 0.2–1)
WBC # BLD AUTO: 11.2 K/UL (ref 3.6–11)
WBC URNS QL MICRO: ABNORMAL /HPF (ref 0–4)

## 2023-09-05 PROCEDURE — 83735 ASSAY OF MAGNESIUM: CPT

## 2023-09-05 PROCEDURE — 81001 URINALYSIS AUTO W/SCOPE: CPT

## 2023-09-05 PROCEDURE — 71046 X-RAY EXAM CHEST 2 VIEWS: CPT

## 2023-09-05 PROCEDURE — 80053 COMPREHEN METABOLIC PANEL: CPT

## 2023-09-05 PROCEDURE — 93005 ELECTROCARDIOGRAM TRACING: CPT | Performed by: NURSE PRACTITIONER

## 2023-09-05 PROCEDURE — 6360000004 HC RX CONTRAST MEDICATION: Performed by: RADIOLOGY

## 2023-09-05 PROCEDURE — 99285 EMERGENCY DEPT VISIT HI MDM: CPT

## 2023-09-05 PROCEDURE — 84484 ASSAY OF TROPONIN QUANT: CPT

## 2023-09-05 PROCEDURE — 71275 CT ANGIOGRAPHY CHEST: CPT

## 2023-09-05 PROCEDURE — 36415 COLL VENOUS BLD VENIPUNCTURE: CPT

## 2023-09-05 PROCEDURE — 85379 FIBRIN DEGRADATION QUANT: CPT

## 2023-09-05 PROCEDURE — 83880 ASSAY OF NATRIURETIC PEPTIDE: CPT

## 2023-09-05 PROCEDURE — 85025 COMPLETE CBC W/AUTO DIFF WBC: CPT

## 2023-09-05 RX ADMIN — IOPAMIDOL 100 ML: 755 INJECTION, SOLUTION INTRAVENOUS at 23:26

## 2023-09-05 ASSESSMENT — ENCOUNTER SYMPTOMS
COUGH: 1
COLOR CHANGE: 0
WHEEZING: 0
SINUS PRESSURE: 0
VOMITING: 0
ABDOMINAL PAIN: 0
SINUS PAIN: 0
SHORTNESS OF BREATH: 1
NAUSEA: 0
EYE PAIN: 0
EYE REDNESS: 0
ABDOMINAL DISTENTION: 0

## 2023-09-05 ASSESSMENT — PAIN - FUNCTIONAL ASSESSMENT: PAIN_FUNCTIONAL_ASSESSMENT: NONE - DENIES PAIN

## 2023-09-06 ENCOUNTER — APPOINTMENT (OUTPATIENT)
Facility: HOSPITAL | Age: 53
End: 2023-09-06
Attending: INTERNAL MEDICINE

## 2023-09-06 PROBLEM — R06.02 SHORTNESS OF BREATH: Status: ACTIVE | Noted: 2023-09-06

## 2023-09-06 PROBLEM — J43.2 CENTRILOBULAR EMPHYSEMA (HCC): Status: ACTIVE | Noted: 2023-09-06

## 2023-09-06 PROBLEM — I31.39 PERICARDIAL EFFUSION: Status: ACTIVE | Noted: 2023-09-06

## 2023-09-06 PROBLEM — I50.9 CHF WITH UNKNOWN LVEF (HCC): Status: ACTIVE | Noted: 2023-09-06

## 2023-09-06 LAB
ALBUMIN SERPL-MCNC: 3.7 G/DL (ref 3.5–5)
ANION GAP SERPL CALC-SCNC: 8 MMOL/L (ref 5–15)
B PERT DNA SPEC QL NAA+PROBE: NOT DETECTED
BASOPHILS # BLD: 0.1 K/UL (ref 0–0.1)
BASOPHILS NFR BLD: 1 % (ref 0–1)
BORDETELLA PARAPERTUSSIS BY PCR: NOT DETECTED
BUN SERPL-MCNC: 13 MG/DL (ref 6–20)
BUN/CREAT SERPL: 9 (ref 12–20)
C PNEUM DNA SPEC QL NAA+PROBE: NOT DETECTED
CALCIUM SERPL-MCNC: 9.6 MG/DL (ref 8.5–10.1)
CHLORIDE SERPL-SCNC: 110 MMOL/L (ref 97–108)
CO2 SERPL-SCNC: 23 MMOL/L (ref 21–32)
COMMENT:: NORMAL
CREAT SERPL-MCNC: 1.47 MG/DL (ref 0.55–1.02)
DIFFERENTIAL METHOD BLD: ABNORMAL
ECHO AO ARCH DIAM: 2.2 CM
ECHO AO ASC DIAM: 3.1 CM
ECHO AO ASCENDING AORTA INDEX: 1.67 CM/M2
ECHO AR MAX VEL PISA: 4.5 M/S
ECHO AV AREA PEAK VELOCITY: 2.7 CM2
ECHO AV AREA/BSA PEAK VELOCITY: 1.5 CM2/M2
ECHO AV PEAK GRADIENT: 7 MMHG
ECHO AV PEAK VELOCITY: 1.3 M/S
ECHO AV REGURGITANT PHT: 568.6 MILLISECOND
ECHO AV VELOCITY RATIO: 0.85
ECHO BSA: 1.92 M2
ECHO LA DIAMETER INDEX: 1.67 CM/M2
ECHO LA DIAMETER: 3.1 CM
ECHO LA VOL 2C: 15 ML (ref 22–52)
ECHO LA VOL 2C: 15 ML (ref 22–52)
ECHO LA VOL 4C: 18 ML (ref 22–52)
ECHO LA VOL 4C: 19 ML (ref 22–52)
ECHO LA VOL BP: 17 ML (ref 22–52)
ECHO LA VOL/BSA BIPLANE: 9 ML/M2 (ref 16–34)
ECHO LA VOLUME AREA LENGTH: 18 ML
ECHO LA VOLUME INDEX AREA LENGTH: 10 ML/M2 (ref 16–34)
ECHO LV E' LATERAL VELOCITY: 8 CM/S
ECHO LV E' SEPTAL VELOCITY: 5 CM/S
ECHO LV EDV A2C: 57 ML
ECHO LV EDV A4C: 56 ML
ECHO LV EDV BP: 58 ML (ref 56–104)
ECHO LV EDV INDEX A4C: 30 ML/M2
ECHO LV EDV INDEX BP: 31 ML/M2
ECHO LV EDV NDEX A2C: 31 ML/M2
ECHO LV EJECTION FRACTION A2C: 77 %
ECHO LV EJECTION FRACTION A4C: 73 %
ECHO LV ESV A2C: 13 ML
ECHO LV ESV A4C: 15 ML
ECHO LV ESV BP: 15 ML (ref 19–49)
ECHO LV ESV INDEX A2C: 7 ML/M2
ECHO LV ESV INDEX A4C: 8 ML/M2
ECHO LV ESV INDEX BP: 8 ML/M2
ECHO LV FRACTIONAL SHORTENING: 42 % (ref 28–44)
ECHO LV INTERNAL DIMENSION DIASTOLE INDEX: 1.94 CM/M2
ECHO LV INTERNAL DIMENSION DIASTOLIC: 3.6 CM (ref 3.9–5.3)
ECHO LV INTERNAL DIMENSION SYSTOLIC INDEX: 1.13 CM/M2
ECHO LV INTERNAL DIMENSION SYSTOLIC: 2.1 CM
ECHO LV IVSD: 0.6 CM (ref 0.6–0.9)
ECHO LV MASS 2D: 53.8 G (ref 67–162)
ECHO LV MASS INDEX 2D: 28.9 G/M2 (ref 43–95)
ECHO LV POSTERIOR WALL DIASTOLIC: 0.6 CM (ref 0.6–0.9)
ECHO LV RELATIVE WALL THICKNESS RATIO: 0.33
ECHO LVOT AREA: 3.1 CM2
ECHO LVOT DIAM: 2 CM
ECHO LVOT MEAN GRADIENT: 2 MMHG
ECHO LVOT PEAK GRADIENT: 5 MMHG
ECHO LVOT PEAK VELOCITY: 1.1 M/S
ECHO LVOT STROKE VOLUME INDEX: 32.4 ML/M2
ECHO LVOT SV: 60.3 ML
ECHO LVOT VTI: 19.2 CM
ECHO MV A VELOCITY: 0.7 M/S
ECHO MV E DECELERATION TIME (DT): 204.3 MS
ECHO MV E VELOCITY: 0.48 M/S
ECHO MV E/A RATIO: 0.69
ECHO MV E/E' LATERAL: 6
ECHO MV E/E' RATIO (AVERAGED): 7.8
ECHO MV E/E' SEPTAL: 9.6
ECHO PULMONARY ARTERY END DIASTOLIC PRESSURE: 2 MMHG
ECHO PV MAX VELOCITY: 0.7 M/S
ECHO PV PEAK GRADIENT: 2 MMHG
ECHO PV REGURGITANT MAX VELOCITY: 0.7 M/S
ECHO RV FREE WALL PEAK S': 8 CM/S
ECHO RV INTERNAL DIMENSION: 4 CM
ECHO RV TAPSE: 1.7 CM (ref 1.7–?)
ECHO TV REGURGITANT MAX VELOCITY: 3.04 M/S
ECHO TV REGURGITANT PEAK GRADIENT: 37 MMHG
EKG ATRIAL RATE: 74 BPM
EKG DIAGNOSIS: NORMAL
EKG P AXIS: 55 DEGREES
EKG P-R INTERVAL: 142 MS
EKG Q-T INTERVAL: 408 MS
EKG QRS DURATION: 90 MS
EKG QTC CALCULATION (BAZETT): 452 MS
EKG R AXIS: 70 DEGREES
EKG T AXIS: -66 DEGREES
EKG VENTRICULAR RATE: 74 BPM
EOSINOPHIL # BLD: 0.1 K/UL (ref 0–0.4)
EOSINOPHIL NFR BLD: 1 % (ref 0–7)
ERYTHROCYTE [DISTWIDTH] IN BLOOD BY AUTOMATED COUNT: 14.6 % (ref 11.5–14.5)
FLUAV SUBTYP SPEC NAA+PROBE: NOT DETECTED
FLUBV RNA SPEC QL NAA+PROBE: NOT DETECTED
GLUCOSE SERPL-MCNC: 98 MG/DL (ref 65–100)
HADV DNA SPEC QL NAA+PROBE: NOT DETECTED
HCOV 229E RNA SPEC QL NAA+PROBE: NOT DETECTED
HCOV HKU1 RNA SPEC QL NAA+PROBE: NOT DETECTED
HCOV NL63 RNA SPEC QL NAA+PROBE: NOT DETECTED
HCOV OC43 RNA SPEC QL NAA+PROBE: NOT DETECTED
HCT VFR BLD AUTO: 41.8 % (ref 35–47)
HGB BLD-MCNC: 13.5 G/DL (ref 11.5–16)
HMPV RNA SPEC QL NAA+PROBE: NOT DETECTED
HPIV1 RNA SPEC QL NAA+PROBE: NOT DETECTED
HPIV2 RNA SPEC QL NAA+PROBE: NOT DETECTED
HPIV3 RNA SPEC QL NAA+PROBE: NOT DETECTED
HPIV4 RNA SPEC QL NAA+PROBE: NOT DETECTED
IMM GRANULOCYTES # BLD AUTO: 0 K/UL (ref 0–0.04)
IMM GRANULOCYTES NFR BLD AUTO: 0 % (ref 0–0.5)
INR PPP: 1 (ref 0.9–1.1)
LYMPHOCYTES # BLD: 1.9 K/UL (ref 0.8–3.5)
LYMPHOCYTES NFR BLD: 20 % (ref 12–49)
M PNEUMO DNA SPEC QL NAA+PROBE: NOT DETECTED
MAGNESIUM SERPL-MCNC: 2 MG/DL (ref 1.6–2.4)
MCH RBC QN AUTO: 29.3 PG (ref 26–34)
MCHC RBC AUTO-ENTMCNC: 32.3 G/DL (ref 30–36.5)
MCV RBC AUTO: 90.7 FL (ref 80–99)
MONOCYTES # BLD: 0.5 K/UL (ref 0–1)
MONOCYTES NFR BLD: 5 % (ref 5–13)
NEUTS SEG # BLD: 6.9 K/UL (ref 1.8–8)
NEUTS SEG NFR BLD: 73 % (ref 32–75)
NRBC # BLD: 0 K/UL (ref 0–0.01)
NRBC BLD-RTO: 0 PER 100 WBC
PHOSPHATE SERPL-MCNC: 4.4 MG/DL (ref 2.6–4.7)
PLATELET # BLD AUTO: 290 K/UL (ref 150–400)
PMV BLD AUTO: 10.6 FL (ref 8.9–12.9)
POTASSIUM SERPL-SCNC: 3.8 MMOL/L (ref 3.5–5.1)
PROCALCITONIN SERPL-MCNC: <0.05 NG/ML
PROTHROMBIN TIME: 10.7 SEC (ref 9–11.1)
RBC # BLD AUTO: 4.61 M/UL (ref 3.8–5.2)
RSV RNA SPEC QL NAA+PROBE: NOT DETECTED
RV+EV RNA SPEC QL NAA+PROBE: NOT DETECTED
SARS-COV-2 RNA RESP QL NAA+PROBE: NOT DETECTED
SODIUM SERPL-SCNC: 141 MMOL/L (ref 136–145)
SPECIMEN HOLD: NORMAL
TROPONIN I SERPL HS-MCNC: 34 NG/L (ref 0–51)
TROPONIN I SERPL HS-MCNC: 41 NG/L (ref 0–51)
WBC # BLD AUTO: 9.5 K/UL (ref 3.6–11)

## 2023-09-06 PROCEDURE — 93010 ELECTROCARDIOGRAM REPORT: CPT | Performed by: STUDENT IN AN ORGANIZED HEALTH CARE EDUCATION/TRAINING PROGRAM

## 2023-09-06 PROCEDURE — 2500000003 HC RX 250 WO HCPCS: Performed by: INTERNAL MEDICINE

## 2023-09-06 PROCEDURE — 80069 RENAL FUNCTION PANEL: CPT

## 2023-09-06 PROCEDURE — 84145 PROCALCITONIN (PCT): CPT

## 2023-09-06 PROCEDURE — 2580000003 HC RX 258: Performed by: INTERNAL MEDICINE

## 2023-09-06 PROCEDURE — 93306 TTE W/DOPPLER COMPLETE: CPT

## 2023-09-06 PROCEDURE — 85610 PROTHROMBIN TIME: CPT

## 2023-09-06 PROCEDURE — 84484 ASSAY OF TROPONIN QUANT: CPT

## 2023-09-06 PROCEDURE — 36415 COLL VENOUS BLD VENIPUNCTURE: CPT

## 2023-09-06 PROCEDURE — 6360000002 HC RX W HCPCS: Performed by: INTERNAL MEDICINE

## 2023-09-06 PROCEDURE — 94761 N-INVAS EAR/PLS OXIMETRY MLT: CPT

## 2023-09-06 PROCEDURE — 82103 ALPHA-1-ANTITRYPSIN TOTAL: CPT

## 2023-09-06 PROCEDURE — 99223 1ST HOSP IP/OBS HIGH 75: CPT | Performed by: INTERNAL MEDICINE

## 2023-09-06 PROCEDURE — APPSS30 APP SPLIT SHARED TIME 16-30 MINUTES: Performed by: NURSE PRACTITIONER

## 2023-09-06 PROCEDURE — 0202U NFCT DS 22 TRGT SARS-COV-2: CPT

## 2023-09-06 PROCEDURE — 1100000000 HC RM PRIVATE

## 2023-09-06 PROCEDURE — 83735 ASSAY OF MAGNESIUM: CPT

## 2023-09-06 PROCEDURE — 2700000000 HC OXYGEN THERAPY PER DAY

## 2023-09-06 PROCEDURE — 2060000000 HC ICU INTERMEDIATE R&B

## 2023-09-06 PROCEDURE — 85025 COMPLETE CBC W/AUTO DIFF WBC: CPT

## 2023-09-06 PROCEDURE — 82104 ALPHA-1-ANTITRYPSIN PHENO: CPT

## 2023-09-06 RX ORDER — SODIUM CHLORIDE 0.9 % (FLUSH) 0.9 %
5-40 SYRINGE (ML) INJECTION PRN
Status: DISCONTINUED | OUTPATIENT
Start: 2023-09-06 | End: 2023-09-08 | Stop reason: HOSPADM

## 2023-09-06 RX ORDER — SODIUM CHLORIDE 0.9 % (FLUSH) 0.9 %
5-40 SYRINGE (ML) INJECTION EVERY 12 HOURS SCHEDULED
Status: DISCONTINUED | OUTPATIENT
Start: 2023-09-06 | End: 2023-09-08 | Stop reason: HOSPADM

## 2023-09-06 RX ORDER — ONDANSETRON 2 MG/ML
4 INJECTION INTRAMUSCULAR; INTRAVENOUS EVERY 6 HOURS PRN
Status: DISCONTINUED | OUTPATIENT
Start: 2023-09-06 | End: 2023-09-08 | Stop reason: HOSPADM

## 2023-09-06 RX ORDER — IPRATROPIUM BROMIDE AND ALBUTEROL SULFATE 2.5; .5 MG/3ML; MG/3ML
1 SOLUTION RESPIRATORY (INHALATION) EVERY 4 HOURS PRN
Status: DISCONTINUED | OUTPATIENT
Start: 2023-09-06 | End: 2023-09-08 | Stop reason: HOSPADM

## 2023-09-06 RX ORDER — ACETAMINOPHEN 650 MG/1
650 SUPPOSITORY RECTAL EVERY 6 HOURS PRN
Status: DISCONTINUED | OUTPATIENT
Start: 2023-09-06 | End: 2023-09-08 | Stop reason: HOSPADM

## 2023-09-06 RX ORDER — BUMETANIDE 0.25 MG/ML
1 INJECTION INTRAMUSCULAR; INTRAVENOUS 2 TIMES DAILY
Status: DISCONTINUED | OUTPATIENT
Start: 2023-09-06 | End: 2023-09-07

## 2023-09-06 RX ORDER — POLYETHYLENE GLYCOL 3350 17 G/17G
17 POWDER, FOR SOLUTION ORAL DAILY PRN
Status: DISCONTINUED | OUTPATIENT
Start: 2023-09-06 | End: 2023-09-08 | Stop reason: HOSPADM

## 2023-09-06 RX ORDER — SODIUM CHLORIDE 9 MG/ML
INJECTION, SOLUTION INTRAVENOUS PRN
Status: DISCONTINUED | OUTPATIENT
Start: 2023-09-06 | End: 2023-09-08 | Stop reason: HOSPADM

## 2023-09-06 RX ORDER — ACETAMINOPHEN 325 MG/1
650 TABLET ORAL EVERY 6 HOURS PRN
Status: DISCONTINUED | OUTPATIENT
Start: 2023-09-06 | End: 2023-09-08 | Stop reason: HOSPADM

## 2023-09-06 RX ORDER — ONDANSETRON 4 MG/1
4 TABLET, ORALLY DISINTEGRATING ORAL EVERY 8 HOURS PRN
Status: DISCONTINUED | OUTPATIENT
Start: 2023-09-06 | End: 2023-09-08 | Stop reason: HOSPADM

## 2023-09-06 RX ORDER — ENOXAPARIN SODIUM 100 MG/ML
40 INJECTION SUBCUTANEOUS DAILY
Status: DISCONTINUED | OUTPATIENT
Start: 2023-09-06 | End: 2023-09-08 | Stop reason: HOSPADM

## 2023-09-06 RX ADMIN — SODIUM CHLORIDE, PRESERVATIVE FREE 10 ML: 5 INJECTION INTRAVENOUS at 21:49

## 2023-09-06 RX ADMIN — SODIUM CHLORIDE, PRESERVATIVE FREE 10 ML: 5 INJECTION INTRAVENOUS at 09:35

## 2023-09-06 RX ADMIN — ENOXAPARIN SODIUM 40 MG: 100 INJECTION SUBCUTANEOUS at 09:33

## 2023-09-06 RX ADMIN — BUMETANIDE 1 MG: 0.25 INJECTION INTRAMUSCULAR; INTRAVENOUS at 21:48

## 2023-09-06 RX ADMIN — BUMETANIDE 1 MG: 0.25 INJECTION INTRAMUSCULAR; INTRAVENOUS at 00:47

## 2023-09-06 RX ADMIN — BUMETANIDE 1 MG: 0.25 INJECTION INTRAMUSCULAR; INTRAVENOUS at 09:35

## 2023-09-06 NOTE — H&P
[address] [place] [address] [id number] [phone]    Hospital Medicine History and Physical      NAME:       Shlomo Lockett   :       1970   MRN:      [id number]     Date of service:   2023     Chief  Complaint:  SOB     History Of Presenting Illness:       Ms. Etta Murphy is a 50 y.o. female who is being admitted for a suspected CHF with unknown LVEF with a pericardial effusion. Ms. Etta Murphy presented to our Emergency Department today complaining of  a progressively worsening SOB over the past three months or so. She was seen [facility] August and treated for bronchitis but despite that, her symptoms have persisted. She says she has a slight cough. Gets SOB with exertion and requires pillows while sleeping. No lower extremities swelling. She does endorse pleuritic chest discomfort. No prior CAD. She has never smoked. In the ED, troponin done was neg. Her pro-BNP was elevated. A CTA chest done showed no pulmonary embolism. No aortic aneurysm or dissection. Cardiomegaly, moderate pericardial effusion, small pleural effusion, minimal parenchymal pulmonary edema and centrilobular emphysema. She will be admitted for further management. Allergies   Allergen Reactions    Ceftriaxone Itching    Ciprofloxacin Itching    Sulfa Antibiotics Itching       Prior to Admission medications    Medication Sig Start Date End Date Taking? Authorizing Provider   FLUoxetine (PROZAC) 40 MG capsule Prozac 40 mg capsule   1 tab PO daily-    Ar Automatic Reconciliation   gabapentin (NEURONTIN) 300 MG capsule Take 300 mg by mouth. 22   Ar Automatic Reconciliation     Past medical history: depression      No past surgical history on file.     Social History     Tobacco Use    Smoking status: Never    Smokeless tobacco: Never   Substance Use Topics ________________________________________________________________________    Data Review: I have reviewed reports and independently interpreted the following  diagnostic tests    Diagnostic testing:    Laboratory data reviewed and independently interpreted:    Recent Labs     09/05/23  2156   WBC 11.2*   HGB 12.6   HCT 39.7   RBC 4.33   MCV 91.7   MCH 29.1        No results found for: LACTA  Recent Labs     09/05/23  2156      K 3.6   *   CO2 20*   GLUCOSE 107*   BUN 14   CREATININE 1.45*   CALCIUM 9.6   MG 2.0   PROT 7.3   BILITOT 1.5*   ALKPHOS 12*   AST 23   ALT 23     No components found for: GLUCOSEPOC  No results found for: CHOL, TRIG, HDL, LDLCALC, LABVLDL    Imaging data reviewed:    XR CHEST (2 VW)    Result Date: 9/5/2023  No acute abnormality. CTA CHEST W WO CONTRAST    Result Date: 9/5/2023  There is no pulmonary embolism. There is no aortic aneurysm or dissection. Cardiomegaly, moderate pericardial effusion, small pleural effusion, minimal parenchymal pulmonary edema and centrilobular emphysema. Incidental findings are as described above. I have also reviewed available old medical records. Assessment & Impression:     Ms. Trey Wolfe is a 50 y.o. female being evaluated for:     Principal Problem:    CHF with unknown LVEF ([address])  Active Problems:    Centrilobular emphysema (HCC)    Pericardial effusion  Resolved Problems:    * No resolved hospital problems. *       Plan of management:    CHF with unknown LVEF / Pericardial effusion POA: unclear etiology. Symptoms now worsening. Admit to hospital. Get an Echocardiogram. Start IV Bumex. Consult cardiology    Centrilobular emphysema POA: never smoked. Emphysematous changes noted on CT. Check a RVP. Consult Pulmonology.  She will need outpatient PFTs     Depression POA: resume Prozac     Code Status:  Full    Surrogate decision maker: Family    Certification: Given the high risk of decompensation, this patient requires a two

## 2023-09-06 NOTE — CONSULTS
Name: Juana Chauhan: Anupama Kate   : 1970 Admit Date: 2023   Phone:   Room: ER19/19   PCP: No primary care provider on file. MRN: [id number]   Date: 2023  Code: Full Code          Chart and notes reviewed. Data reviewed. I review the patient's current medications in the medical record at each encounter. I have evaluated and examined the patient. HPI:    7:49 AM       History was obtained from patient. I was asked by Cherry Tang MD to see Pradeep Jennings in consultation for a chief complaint of emphysema. History of Present Illness:  Ms. Luis Chacon is a 49 yo woman with a history of depression who is admitted with progressive shortness of breath. She describes shortness of breath over the past three months or so. She denies cough, wheezing, or edema. She describes orthopnea and some pleuritic chest pain. She denies a known history of lung disease including COPD, asthma, or reccurent bronchitis, but was told at [facility] that her recent symptoms were likely due to bronchitis. She is a nonsmoker, but did have significant secondhand smoke exposure as a child. Labs: cr 1.45, proBNP 8449, WBC 11.2, d-dimer 1.54,     Images reviewed:  CTA chest 2023: negative for PE; coronary artery calcfiications; small bilateral pleural effusions with moderate pericardial effusion; diffuse ggo with peripheral sparing, ?mild emphysema, no LAD      No past medical history on file. No past surgical history on file.     Family History   Problem Relation Age of Onset    Heart Disease Neg Hx        Social History     Tobacco Use    Smoking status: Never    Smokeless tobacco: Never   Substance Use Topics    Alcohol use: Never       Allergies   Allergen Reactions    Ceftriaxone Itching    Ciprofloxacin Itching    Sulfa Antibiotics Itching       Current Facility-Administered Medications   Medication Dose Route Frequency    sodium chloride flush 0.9 % injection 5-40

## 2023-09-06 NOTE — PROGRESS NOTES
Hospitalist Progress Note  Chemo Frazier MD  Answering service: [phone] OR [phone] from in house phone        Date of Service:  2023  NAME:  Christie Puentes  :  1970  MRN:  [id number]      Admission Summary/HPI:   Ms. Lisa Garcia is a 50 y.o. female who is being admitted for a suspected CHF with unknown LVEF with a pericardial effusion. Ms. Lisa Garcia presented to our Emergency Department today complaining of  a progressively worsening SOB over the past three months or so. She was seen at a [facility] in August and treated for bronchitis but despite that, her symptoms have persisted. She says she has a slight cough. Gets SOB with exertion and requires pillows while sleeping. No lower extremities swelling. She does endorse pleuritic chest discomfort. No prior CAD. She has never smoked. In the ED, troponin done was neg. Her pro-BNP was elevated. A CTA chest done showed no pulmonary embolism. No aortic aneurysm or dissection. Cardiomegaly, moderate pericardial effusion, small pleural effusion, minimal parenchymal pulmonary edema and centrilobular emphysema. She will be admitted for further management. Interval history / Subjective:   Remains admitted, on oxygen, feeling well. No dyspnea at rest.  Awaiting echo and cardiology evaluation. She has no complaints at this time. States the dyspnea has been ongoing, but that she was told was possible bronchitis. 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 --Symptoms now worsening.  --Echocardiogram.  --Continue IV Bumex. --Consult cardiology     Centrilobular emphysema POA:  --Never smoked. --Emphysematous changes noted on CT.  --Check a RVP.   --Pulmonology  --Alpha 1

## 2023-09-06 NOTE — ED TRIAGE NOTES
Pt arrives ambulatory to triage with c/o SOB that started 3-4 months ago. Pt was seen at [facility] and referred here due to enlarged heart and a problem with her EKG.

## 2023-09-06 NOTE — ED NOTES
Pt is complaining of cramps in her calf bilaterally and her big toe, when she tried to get up to bedside commode/Provider made aware.      Leon Robins, OLEG  09/06/23 [id number]

## 2023-09-06 NOTE — ED PROVIDER NOTES
[facility] EMERGENCY DEPT  EMERGENCY DEPARTMENT ENCOUNTER      Pt Name: Belita Harada  MRN: [id number]  [address] 1970  Date of evaluation: 9/5/2023  Provider: DOYLE Watts NP      HISTORY OF PRESENT ILLNESS      This is a 46-year-old female who presents ambulatory to the emergency room with complaints of shortness of breath. Patient states this started approximately 3 to 4 months ago. [facility] and [facility] diagnosed with [facility]. At that time she was ordered [facility] as well as an azithromycin pack. Return today to [facility] and was referred to the emergency room with an abnormal EKG and \"an enlarged heart. \"  Patient is denying any chest pain but does states she has worsening shortness of breath on exertion. Denies any dizziness, nausea or vomiting, fevers or chills. States at rest she feels okay. No calf pain or tenderness. No further complaints. The history is provided by the patient. Nursing Notes were reviewed. REVIEW OF SYSTEMS         Review of Systems   Constitutional:  Negative for appetite change, chills, diaphoresis and fatigue. HENT:  Negative for congestion, sinus pressure and sinus pain. Eyes:  Negative for pain and redness. Respiratory:  Positive for cough and shortness of breath. Negative for wheezing. Cardiovascular:  Negative for chest pain and palpitations. Gastrointestinal:  Negative for abdominal distention, abdominal pain, nausea and vomiting. Genitourinary:  Negative for difficulty urinating, frequency and urgency. Musculoskeletal:  Negative for arthralgias, neck pain and neck stiffness. Skin:  Negative for color change, pallor, rash and wound. Neurological:  Negative for speech difficulty and headaches. Hematological:  Does not bruise/bleed easily. Psychiatric/Behavioral:  The patient is not nervous/anxious. PAST MEDICAL HISTORY   No past medical history on file.       SURGICAL HISTORY

## 2023-09-06 NOTE — ED NOTES
Bedside shift change report given to Helga Arnett RN (oncoming nurse) by Cheyenne Anderson RN (offgoing nurse). Report included the following information Nurse Handoff Report, ED SBAR, MAR, and Recent Results.        Rohith Valdez RN  09/06/23 [id number]

## 2023-09-06 NOTE — CONSULTS
[facility]                    Cardiology Care Note     [x]Initial Encounter     []Follow-up    Patient Name: Christie Puentes - X:[birth year] - RYI:[id number]  Primary Cardiologist: none  Consulting Cardiologist: Zoe Zimmer MD     Reason for encounter: CHF?, pericardial effusion     HPI:       Christie Puentes is a 50 y.o. female with PMH significant for depression. Pt reports over the last few months progressive dyspnea, mostly with exertion, associated with a cough. She was seen at [facility] in August and treated for bronchitis but her symptoms have persisted. She denies any CP, palpitations or edema. She denies any prior hx of heart disease or CHF. Denies any family hx of heart disease/CHF. Denies any tobacco use. On admission, her pBNP was elevated and noted to have pericardial effusion on chest CTA. Subjective:      Radha ANNA Mata reports  none currently . Assessment and Plan     Elevated pBNP, ?CHF: unknown EF, TTE pending. Cardiomegaly on CTA, neg for PE. Trop neg. Cont IV bumex for now     2. Pericardial effusion: TTE underway at bedside, appears small on echo. Viral panel pending      3. Abnormal chest CTA w/ centrilobular emphysema: denies prior hx of, pulmonary consulted     4. CKD?: Cr elevated to 1.45, trend w/ diuresis        ____________________________________________________________    Cardiac testing  No results found for this or any previous visit. No results found for this or any previous visit. No results found for this or any previous visit.       Most recent HS troponins:  Recent Labs     09/05/23  2157 09/06/23  0046   TROPHS 40 41       ECG:   Encounter Date: 09/05/23   EKG 12 Lead   Result Value    Ventricular Rate 74    Atrial Rate 74    P-R Interval 142    QRS Duration 90    Q-T Interval 408    QTc Calculation (Bazett) 452    P Axis 55    R Axis 70    T Axis -66    Diagnosis      Normal sinus rhythm with sinus arrhythmia  RSR' or QR pattern of  filling defects. No lytic or blastic lesions are identified. The remainder of  the upper abdomen visualized is unremarkable. Impression  There is no pulmonary embolism. There is no aortic aneurysm or dissection. Cardiomegaly, moderate pericardial effusion, small pleural effusion, minimal  parenchymal pulmonary edema and centrilobular emphysema. Incidental findings are as described above. Lab Results   Component Value Date/Time     09/05/2023 09:56 PM    K 3.6 09/05/2023 09:56 PM     09/05/2023 09:56 PM    CO2 20 09/05/2023 09:56 PM    BUN 14 09/05/2023 09:56 PM    CREATININE 1.45 09/05/2023 09:56 PM    GLUCOSE 107 09/05/2023 09:56 PM    CALCIUM 9.6 09/05/2023 09:56 PM    LABGLOM 44 09/05/2023 09:56 PM      No results found for: BNP  Lab Results   Component Value Date    WBC 11.2 (H) 09/05/2023    HGB 12.6 09/05/2023    HCT 39.7 09/05/2023    MCV 91.7 09/05/2023     09/05/2023     No results for input(s): CHOL, HDLC, LDLC in the last 72 hours.     Invalid input(s): TGL,  HBA1C    Current meds:    Current Facility-Administered Medications:     sodium chloride flush 0.9 % injection 5-40 mL, 5-40 mL, IntraVENous, 2 times per day, Sabrina Yoo MD    sodium chloride flush 0.9 % injection 5-40 mL, 5-40 mL, IntraVENous, PRN, Sabrina Yoo MD    0.9 % sodium chloride infusion, , IntraVENous, PRN, Sabrina Yoo MD    ondansetron (ZOFRAN-ODT) disintegrating tablet 4 mg, 4 mg, Oral, Q8H PRN **OR** ondansetron (ZOFRAN) injection 4 mg, 4 mg, IntraVENous, Q6H PRN, Sabrina Yoo MD    polyethylene glycol (GLYCOLAX) packet 17 g, 17 g, Oral, Daily PRN, Sabrina Yoo MD    acetaminophen (TYLENOL) tablet 650 mg, 650 mg, Oral, Q6H PRN **OR** acetaminophen (TYLENOL) suppository 650 mg, 650 mg, Rectal, Q6H PRN, Sabrina Yoo MD    enoxaparin (LOVENOX) injection 40 mg, 40 mg, SubCUTAneous, Daily, Sabrina Yoo MD    bumetanide (BUMEX) injection 1 mg, 1 mg, IntraVENous, BID, Joehsan Moder

## 2023-09-07 LAB
ALBUMIN SERPL-MCNC: 3.8 G/DL (ref 3.5–5)
ALBUMIN SERPL-MCNC: 3.8 G/DL (ref 3.5–5)
ALBUMIN/GLOB SERPL: 1.1 (ref 1.1–2.2)
ALP SERPL-CCNC: 14 U/L (ref 45–117)
ALT SERPL-CCNC: 24 U/L (ref 12–78)
ANION GAP SERPL CALC-SCNC: 8 MMOL/L (ref 5–15)
AST SERPL-CCNC: 25 U/L (ref 15–37)
BASOPHILS # BLD: 0.1 K/UL (ref 0–0.1)
BASOPHILS NFR BLD: 1 % (ref 0–1)
BILIRUB DIRECT SERPL-MCNC: 0.4 MG/DL (ref 0–0.2)
BILIRUB SERPL-MCNC: 2.1 MG/DL (ref 0.2–1)
BUN SERPL-MCNC: 15 MG/DL (ref 6–20)
BUN/CREAT SERPL: 11 (ref 12–20)
CALCIUM SERPL-MCNC: 9.4 MG/DL (ref 8.5–10.1)
CHLORIDE SERPL-SCNC: 108 MMOL/L (ref 97–108)
CO2 SERPL-SCNC: 24 MMOL/L (ref 21–32)
COMMENT:: NORMAL
CREAT SERPL-MCNC: 1.42 MG/DL (ref 0.55–1.02)
DIFFERENTIAL METHOD BLD: ABNORMAL
EOSINOPHIL # BLD: 0.1 K/UL (ref 0–0.4)
EOSINOPHIL NFR BLD: 2 % (ref 0–7)
ERYTHROCYTE [DISTWIDTH] IN BLOOD BY AUTOMATED COUNT: 14.6 % (ref 11.5–14.5)
GLOBULIN SER CALC-MCNC: 3.6 G/DL (ref 2–4)
GLUCOSE SERPL-MCNC: 93 MG/DL (ref 65–100)
HCT VFR BLD AUTO: 43.6 % (ref 35–47)
HGB BLD-MCNC: 14.1 G/DL (ref 11.5–16)
IMM GRANULOCYTES # BLD AUTO: 0 K/UL (ref 0–0.04)
IMM GRANULOCYTES NFR BLD AUTO: 1 % (ref 0–0.5)
LYMPHOCYTES # BLD: 2.1 K/UL (ref 0.8–3.5)
LYMPHOCYTES NFR BLD: 25 % (ref 12–49)
MAGNESIUM SERPL-MCNC: 2 MG/DL (ref 1.6–2.4)
MCH RBC QN AUTO: 28.9 PG (ref 26–34)
MCHC RBC AUTO-ENTMCNC: 32.3 G/DL (ref 30–36.5)
MCV RBC AUTO: 89.3 FL (ref 80–99)
MONOCYTES # BLD: 0.7 K/UL (ref 0–1)
MONOCYTES NFR BLD: 8 % (ref 5–13)
NEUTS SEG # BLD: 5.5 K/UL (ref 1.8–8)
NEUTS SEG NFR BLD: 63 % (ref 32–75)
NRBC # BLD: 0 K/UL (ref 0–0.01)
NRBC BLD-RTO: 0 PER 100 WBC
NT PRO BNP: 6437 PG/ML
PHOSPHATE SERPL-MCNC: 4.4 MG/DL (ref 2.6–4.7)
PLATELET # BLD AUTO: 288 K/UL (ref 150–400)
PMV BLD AUTO: 10.4 FL (ref 8.9–12.9)
POTASSIUM SERPL-SCNC: 3.5 MMOL/L (ref 3.5–5.1)
PROT SERPL-MCNC: 7.4 G/DL (ref 6.4–8.2)
RBC # BLD AUTO: 4.88 M/UL (ref 3.8–5.2)
SODIUM SERPL-SCNC: 140 MMOL/L (ref 136–145)
SPECIMEN HOLD: NORMAL
WBC # BLD AUTO: 8.5 K/UL (ref 3.6–11)

## 2023-09-07 PROCEDURE — 6360000002 HC RX W HCPCS: Performed by: INTERNAL MEDICINE

## 2023-09-07 PROCEDURE — 83735 ASSAY OF MAGNESIUM: CPT

## 2023-09-07 PROCEDURE — 80069 RENAL FUNCTION PANEL: CPT

## 2023-09-07 PROCEDURE — APPSS30 APP SPLIT SHARED TIME 16-30 MINUTES: Performed by: NURSE PRACTITIONER

## 2023-09-07 PROCEDURE — 36415 COLL VENOUS BLD VENIPUNCTURE: CPT

## 2023-09-07 PROCEDURE — 1100000000 HC RM PRIVATE

## 2023-09-07 PROCEDURE — 80076 HEPATIC FUNCTION PANEL: CPT

## 2023-09-07 PROCEDURE — 99232 SBSQ HOSP IP/OBS MODERATE 35: CPT | Performed by: INTERNAL MEDICINE

## 2023-09-07 PROCEDURE — 83880 ASSAY OF NATRIURETIC PEPTIDE: CPT

## 2023-09-07 PROCEDURE — 2500000003 HC RX 250 WO HCPCS: Performed by: INTERNAL MEDICINE

## 2023-09-07 PROCEDURE — 94761 N-INVAS EAR/PLS OXIMETRY MLT: CPT

## 2023-09-07 PROCEDURE — 2580000003 HC RX 258: Performed by: INTERNAL MEDICINE

## 2023-09-07 PROCEDURE — 2700000000 HC OXYGEN THERAPY PER DAY

## 2023-09-07 PROCEDURE — 85025 COMPLETE CBC W/AUTO DIFF WBC: CPT

## 2023-09-07 RX ORDER — BUMETANIDE 0.25 MG/ML
1 INJECTION INTRAMUSCULAR; INTRAVENOUS DAILY
Status: DISCONTINUED | OUTPATIENT
Start: 2023-09-08 | End: 2023-09-08

## 2023-09-07 RX ADMIN — SODIUM CHLORIDE, PRESERVATIVE FREE 10 ML: 5 INJECTION INTRAVENOUS at 08:24

## 2023-09-07 RX ADMIN — MICONAZOLE NITRATE: 2 POWDER TOPICAL at 12:48

## 2023-09-07 RX ADMIN — ENOXAPARIN SODIUM 40 MG: 100 INJECTION SUBCUTANEOUS at 08:24

## 2023-09-07 RX ADMIN — MICONAZOLE NITRATE: 2 POWDER TOPICAL at 22:31

## 2023-09-07 RX ADMIN — BUMETANIDE 1 MG: 0.25 INJECTION INTRAMUSCULAR; INTRAVENOUS at 08:24

## 2023-09-07 NOTE — PROGRESS NOTES
[facility]                    Cardiology Care Note     []Initial Encounter     [x]Follow-up    Patient Name: Vasile Ortiz [facility]:[id number]  Primary Cardiologist: none  Consulting Cardiologist: Corey Winter MD     Reason for encounter: CHF?, pericardial effusion     HPI:       Dori Estrella is a 50 y.o. female with PMH significant for depression. Pt reports over the last few months progressive dyspnea, mostly with exertion, associated with a cough. She was seen at [facility] in August and treated for bronchitis but her symptoms have persisted. She denies any CP, palpitations or edema. She denies any prior hx of heart disease or CHF. Denies any family hx of heart disease/CHF. Denies any tobacco use. On admission, her pBNP was elevated and noted to have pericardial effusion on chest CTA. Subjective:      Radha Mata reports breathing better. Assessment and Plan     Acute HFpEF, mod RV dysfunction: EF 60-65%. Cardiomegaly on CTA, neg for PE. Trop neg. Cont IV bumex for another day - decrease to daily d/t lower BP. T bili up to 2.1 likely d/t congestion. Repeat pBNP     2. Pericardial effusion: TTE w/ mod effusion without evidence of tamponade, would repeat TTE as OP 4-6 weeks. Viral panel neg      3. Abnormal chest CTA w/ centrilobular emphysema: denies prior hx of, pulmonary consulted     4. CKD?: Cr elevated to 1.45, trend w/ diuresis     Will cont further workup and rule out of amyloid, sarcoidosis in the OP setting w/ cardiac MRI , likely d/c tomorrow        ____________________________________________________________    Cardiac testing  09/05/23    ECHO (TTE) COMPLETE (CONTRAST/BUBBLE/3D PRN) 09/06/2023 12:56 PM (Final)    Interpretation Summary    Left Ventricle: Normal left ventricular systolic function with a visually estimated EF of 60 - 65%. Not well visualized. Left ventricle is smaller than normal. Normal wall thickness. Normal wall motion. plane.    FINDINGS:  PE: This is a good quality study for the evaluation of pulmonary embolism to the  subsegmental arterial level. There is no pulmonary embolism to this level. CORONARY VASCULAR CALCIFICATIONS:  Present  There is no aortic aneurysm or dissection. Rim calcified small splenic artery aneurysm is unchanged and less than 15 mm in  short access. Hepatic steatosis and cholecystectomy. There is a moderate hiatal  hernia. There is a moderate pericardial effusion. Small bilateral pleural  effusions. Coronary vascular calcifications, centrilobular emphysema and mild  scattered groundglass opacity. There is no pleural or pericardial effusion. There is no mediastinal, axillary or hilar lymphadenopathy. The aorta is normal  in course and caliber. The proximal pulmonary arteries are without evidence of  filling defects. No lytic or blastic lesions are identified. The remainder of  the upper abdomen visualized is unremarkable. Impression  There is no pulmonary embolism. There is no aortic aneurysm or dissection. Cardiomegaly, moderate pericardial effusion, small pleural effusion, minimal  parenchymal pulmonary edema and centrilobular emphysema. Incidental findings are as described above. Lab Results   Component Value Date/Time     09/07/2023 05:04 AM    K 3.5 09/07/2023 05:04 AM     09/07/2023 05:04 AM    CO2 24 09/07/2023 05:04 AM    BUN 15 09/07/2023 05:04 AM    CREATININE 1.42 09/07/2023 05:04 AM    GLUCOSE 93 09/07/2023 05:04 AM    CALCIUM 9.4 09/07/2023 05:04 AM    LABGLOM 45 09/07/2023 05:04 AM      No results found for: BNP  Lab Results   Component Value Date    WBC 8.5 09/07/2023    HGB 14.1 09/07/2023    HCT 43.6 09/07/2023    MCV 89.3 09/07/2023     09/07/2023     No results for input(s): CHOL, HDLC, LDLC in the last 72 hours.     Invalid input(s): TGL,  HBA1C    Current meds:    Current Facility-Administered Medications:     sodium chloride flush 0.9 % injection 5-40

## 2023-09-07 NOTE — ACP (ADVANCE CARE PLANNING)
Advance Care Planning     Advance Care Planning Inpatient Note  [facility] Department    Today's Date: 9/7/2023  Unit: [facility] 5M1 MED SURG 1    Received request from IDT Member. Upon review of chart and communication with care team, patient's decision making abilities are not in question. . Patient was/were present in the room during visit. Goals of ACP Conversation:  Discuss advance care planning documents    Health Care Decision Makers:       Primary Decision Maker: Campos Mata (spouse) - Spouse - [phone]    Secondary Decision Maker: Sherif Hills (daughter) - Child - [phone]  Summary:  Completed [address] [facility]    Advance Care Planning Documents (Patient Wishes):  Healthcare Power of /Advance Directive Appointment of [address] [facility] [address]  Living Will/Advance Directive  Anatomical Gift/Organ Donation     Assessment:   visit for the patient on Med Surg with an Advance Medical Directive (AMD) consult. Reviewed pt's chart and spoke with pt's nurse. With the pt completed an AMD that reflects the values and wishes of the pt for a time when she cannot speak for herself. Pt chose her spouse, Neelima Whitmore, an [facility] maker (HCDM) and daughter, Michael Stein as Secondary HCDM. Interventions:  Provided education on documents for clarity and greater understanding  Discussed and provided education on state decision maker hierarchy  Assisted in the completion of documents according to patient's wishes at this time    Care Preferences Communicated:   No    Outcomes/Plan:  New advance directive completed. Returned original document(s) to patient, as well as copies for distribution to appointed agents  Copy of advance directive given to staff to scan into medical record.   Teach Back Method used to verify the patient's and/or Healthcare Decision Maker's understanding of key information in the advance directive documents    Electronically signed by

## 2023-09-07 NOTE — WOUND CARE
Wound consult: new patient consult for skin irritation in gluteal cleft/inner buttocks. Patient alert, oriented x 4, moves independently in bed side to side. She tells me she has this issue often. Used antifungal at home. Assessment:  Gluteal cleft/inner buttocks - confluent red rash with skin irritation and tenderness, moisture associated skin damage with secondary candidiasis due to intertrigo. No rash in breast folds. Groin unaffected now but has issues here as well at baseline with same. Treatment:  Applied zinc ointment to affected area. Tucked a piece of Dri Go into cleft also. Explained use to patient. Recommendations/Plan:  Add antifungal powder to affected area along with zinc.  Hand off to Dr. Cynthia Sanford and patient's nurse. Please re-consult if needed.   Clover Artis RN, [employer]

## 2023-09-07 NOTE — PROGRESS NOTES
Name: Juana Chauhan: [facility]   : 1970 Admit Date: 2023   Phone:   Room: 329/01   PCP: No primary care provider on file. MRN: [id number]   Date: 2023  Code: Full Code          Chart and notes reviewed. Data reviewed. I review the patient's current medications in the medical record at each encounter. I have evaluated and examined the patient. HPI:    8:42 AM       History was obtained from patient. I was asked by Cherry Tang MD to see Pradeep Jennings in consultation for a chief complaint of emphysema. History of Present Illness:  Ms. Luis Chacon is a 51 yo woman with a history of depression who is admitted with progressive shortness of breath. She describes shortness of breath over the past three months or so. She denies cough, wheezing, or edema. She describes orthopnea and some pleuritic chest pain. She denies a known history of lung disease including COPD, asthma, or reccurent bronchitis, but was told at [facility] that her recent symptoms were likely due to bronchitis. She is a nonsmoker, but did have significant secondhand smoke exposure as a child. Labs: cr 1.45, proBNP 8449, WBC 11.2, d-dimer 1.54,     Images reviewed:  CTA chest 2023: negative for PE; coronary artery calcfiications; small bilateral pleural effusions with moderate pericardial effusion; diffuse ggo with peripheral sparing, ?mild emphysema, no LAD    Interval History:  Afebrile  BP soft but stable  Sats 93% on RA  600 documented UOP  Creat 1.42--decreased   pro-BNP 8449  WBC 8.5--decreased  RVP negative  TTE: EF 60-65%, LV is smaller than normal, normal diastolic function, moderately reduced systolic function, moderate localized pericardial effusion present around the posterior and left ventricle. ROS: Feeling pretty good today. Still SOB with ambulating. Has a dry cough. Overall, improved from yesterday.     Past Medical History:   Diagnosis Date S1, S2 normal, no murmur, click, rub or gallop. Abdomen:   Soft, non-tender. Bowel sounds normal.    Extremities: Extremities normal, atraumatic, no cyanosis or edema. Pulses: 2+ and symmetric all extremities.    Skin: Skin color, texture, turgor normal. No rashes or lesions       Neurologic: Grossly nonfocal       Lab Results   Component Value Date/Time     09/07/2023 05:04 AM    K 3.5 09/07/2023 05:04 AM     09/07/2023 05:04 AM    CO2 24 09/07/2023 05:04 AM    BUN 15 09/07/2023 05:04 AM    MG 2.0 09/07/2023 05:04 AM    PHOS 4.4 09/07/2023 05:04 AM       Lab Results   Component Value Date/Time    WBC 8.5 09/07/2023 05:04 AM    HGB 14.1 09/07/2023 05:04 AM     09/07/2023 05:04 AM    MCV 89.3 09/07/2023 05:04 AM       Lab Results   Component Value Date/Time    INR 1.0 09/06/2023 09:19 AM    GLOB 3.6 09/07/2023 05:04 AM       IMPRESSION   Acute respiratory failure with hypoxia, improved  Pleural effusions  Pericardial effusions  Suspected CHF  Abnormal chest imaging with diffuse ggo concerning for edema, ?emphsyema  CKD    PLAN  Goal sats 90% or higher, wean as tolerated, now on RA  Exercise oximetry closer to discharge  Diuresis as tolerated  Cardiology consulted  Follow-up alpha-1  Needs PFTs as an outpatient  Pleural effusions not amenable to drainage  Encourage IS use  OOB  PRN iftikhar Olvera, DOYLE - RAMU

## 2023-09-07 NOTE — CARE COORDINATION
09/07/23 0920   Service Assessment   Patient Orientation Alert and Oriented;Person;Place;Situation;Self   Cognition Alert   History Provided By Patient   Primary Caregiver Self   Support Systems Spouse/Significant Other;Parent; Children;Family Members;Friends/Neighbors   Patient's Healthcare Decision Maker is: Legal Next of Kin   PCP Verified by CM Yes   Last Visit to PCP Within last 3 months   Prior Functional Level Independent in ADLs/IADLs   Current Functional Level Independent in ADLs/IADLs   Can patient return to prior living arrangement Yes   Family able to assist with home care needs: Yes   Would you like for me to discuss the discharge plan with any other family members/significant others, and if so, who? No   Financial Resources   (commercial insurance)   Social/Functional History   Lives With Spouse   Type of [address] [place] [address]  One level   Home Access Level entry   [facility] Yes   Discharge Planning   Type of Residence House   Patient expects to be discharged to: House     Pt lives with her  who is working out of state until October. She has family in the area. Pt plans to transport herself home at d/c. [facility]acy[facility]  Pt's PCP: [facility]. Pt was given a list of BS providers. CM following for d/c needs.

## 2023-09-07 NOTE — ED NOTES
TRANSFER - OUT REPORT:    Verbal report given to Naila Barker RN on Neftali Lund  being transferred to [facility] for routine progression of patient care       Report consisted of patient's Situation, Background, Assessment and   Recommendations(SBAR). Information from the following report(s) Nurse Handoff Report, ED Encounter Summary, ED SBAR, Adult Overview, and MAR was reviewed with the receiving nurse. [place] Assessment:    Presents to emergency department  because of falls (Syncope, seizure, or loss of consciousness): No  Age > 70: No  Altered Mental Status, Intoxication with alcohol or substance confusion (Disorientation, impaired judgment, poor safety awaremess, or inability to follow instructions): No  Impaired Mobility: Ambulates or transfers with assistive devices or assistance; Unable to ambulate or transer.: No  Nursing Judgement: No          Lines:   Peripheral IV 09/05/23 Left Antecubital (Active)   Site Assessment Clean, dry & intact 09/06/23 0730   Line Status Brisk blood return 09/06/23 0730   Line Care Connections checked and tightened 09/06/23 0730   Phlebitis Assessment No symptoms 09/06/23 0730   Infiltration Assessment 0 09/06/23 0730   Alcohol Cap Used No 09/06/23 0730   Dressing Status Clean, dry & intact 09/06/23 0730   Dressing Type Transparent 09/06/23 0730   Dressing Intervention New 09/05/23 3951        Opportunity for questions and clarification was provided.       Patient transported with:  O2 @ 2lpm and 1648 Jay Chavez RN  09/06/23 2010

## 2023-09-07 NOTE — PLAN OF CARE
Problem: Chronic Conditions and Co-morbidities  Goal: Patient's chronic conditions and co-morbidity symptoms are monitored and maintained or improved  9/7/2023 0934 by Henry Jacobson RN  Outcome: Progressing  9/7/2023 0221 by Leona Wadsworth RN  Outcome: Progressing

## 2023-09-07 NOTE — DISCHARGE INSTRUCTIONS
My Heart Failure Action Plan   If you notice changes in your heart failure symptoms, follow your heart failure action plan. Acting quickly will help you to feel better and stay out of the hospital.      Danger Zone - Seek Help:  My symptoms:   Sudden, severe shortness of breath   Develop new chest pain/tightness/ heaviness   Sweating, weakness or fainting  What to do:  Call 911 NOW  ______________________________________________________________________    Caution Zone - I do not feel well:  Weight up by 3 lb in one day or 5 lb in a week   Swelling in ankles, legs or abdomen   Hard to breath when active or at night   Need to use more pillows at night   Constant cough or wheeze   Very tired  Weight down by 3 lb   Dry mouth/skin   Dizziness, Low blood pressure   What to do:  Call your Primary Care Physician for guidance  No primary care provider on file.   {No primary care provider on file}   Call your Cardiologist for guidance  Call [address] at [phone]  ________________________________________________________________  Clear Zone - I feel well:   My symptoms:   Weight on target range, no weight gain over 2 lb   Little or no swelling   Breathing is easy, no shortness of breath   What to do:  Keep taking my pills  Keep doing my daily checks -  weight, swelling and breathing   Keep eating a healthy, low salt diet  Keep making changes to improve my health  Attend all follow up appointments as scheduled          [address] [facility]    NAME:   Meryl Mata   :  1970   MRN:  [id number]     Date:     2023    ADMIT DATE: 2023     DISCHARGE DATE: 2023     PRINCIPAL ADMITTING DIAGNOSIS:  Shortness of breath [R06.02]  Pericardial effusion [I31.39]  Acute pulmonary edema ([address]) [J81.0]  Pleural effusion [J90]  Tachycardia [R00.0]  Elevated troponin [R77.8]  Elevated brain natriuretic peptide (BNP) level [R79.89]  CHF with unknown LVEF ([address]) [I50.9]  Elevated d-dimer

## 2023-09-07 NOTE — NURSE NAVIGATOR
Chart reviewed by Heart Failure Nurse Navigator. Heart Failure database completed. Patient came to ED with shortness of breath and is admitted with acute respiratory failure, new pleural and pericardial effusion and new onset of HFpEF with reduced RV function. Chest imaging abnormall with concern for emphysema. Cardiology and Pulmonology are consulted. Viral panel is pending and Cardiology planning for OP work up to rule out amyloid/sarcoidosis with cardiac MRI. EF:  60 to 65% with normal wall thickness, wall motion, LV size smaller than normal, RV with moderately reduced systolic function, moderate pericardial effusion    ACEi/ARB/ARNi: not indicated    BB: not indicated    Aldosterone Antagonist: **    Obstructive Sleep Apnea Screening:   Referred to Sleep Medicine:     CRT Not indicated     NYHA Functional Class **. Heart Failure Teach Back in Patient Education. Heart Failure Avoiding Triggers on Discharge Instructions. Cardiologist: Dr Palmer Khanna consulted      Post discharge follow up phone call to be made within 48-72 hours of discharge.

## 2023-09-07 NOTE — PROGRESS NOTES
Spiritual Care Assessment/Progress Note  [facility]    Name: Omi Paris MRN: [id number]    Age: 50 y.o. Sex: female   Language: English     Date: 9/7/2023            Total Time Calculated: 55 min              Spiritual Assessment begun in SF 5M1 MED SURG 1  Service Provided For[de-identified] Patient  Referral/Consult From[de-identified] Multi-disciplinary team  Encounter Overview/Reason : Advance Care Planning    Spiritual beliefs:      [] Involved in a yelena tradition/spiritual practice:      [] Supported by a yelena community:      [] Claims no spiritual orientation:      [] Seeking spiritual identity:           [] Adheres to an individual form of spirituality:      [x] Not able to assess:                Identified resources for coping and support system:   Support System: Family members       [] Prayer                  [] Devotional reading               [] Music                  [] Guided Imagery     [] Pet visits                                        [] Other: (COMMENT)     Specific area/focus of visit   Encounter:    Crisis:    Spiritual/Emotional needs: Type: Spiritual Support  Ritual, Rites and Sacraments:    Grief, Loss, and Adjustments:    Ethics/Mediation:    Behavioral Health:    Palliative Care: Advance Care Planning: Type: ACP conversation, Completed AD/ACP document(s)    Plan/Referrals: Other (Comment) (Please consult Spiritual Care for further consults.)    Narrative:  visit for the patient on Med Surg with an Advance Medical Directive (AMD) consult. Reviewed pt's chart and spoke with pt's nurse. With the pt completed an AMD that reflects the values and wishes of the pt for a time when she cannot speak for herself. Pt chose her spouse, Venice Wei, an [facility] maker ([facility]) and daughter, Chuyita Flores as Secondary HCDM. Advised of  availability. Please contact Spiritual Care for further referrals.     [facility] MS Mery, MDiv, [facility]  Staff   Paging service: [phone] (SAURABH)

## 2023-09-08 VITALS
SYSTOLIC BLOOD PRESSURE: 94 MMHG | HEIGHT: 63 IN | BODY MASS INDEX: 31.05 KG/M2 | DIASTOLIC BLOOD PRESSURE: 63 MMHG | OXYGEN SATURATION: 97 % | HEART RATE: 88 BPM | RESPIRATION RATE: 16 BRPM | WEIGHT: 175.27 LBS | TEMPERATURE: 98.1 F

## 2023-09-08 PROBLEM — I50.31 ACUTE HEART FAILURE WITH PRESERVED EJECTION FRACTION (HFPEF) (HCC): Status: ACTIVE | Noted: 2023-09-08

## 2023-09-08 LAB
A1AT PHENOTYP SERPL IFE: NORMAL
A1AT SERPL-MCNC: 157 MG/DL (ref 101–187)
ALBUMIN SERPL-MCNC: 3.4 G/DL (ref 3.5–5)
ALBUMIN SERPL-MCNC: 3.4 G/DL (ref 3.5–5)
ALBUMIN/GLOB SERPL: 0.9 (ref 1.1–2.2)
ALP SERPL-CCNC: 14 U/L (ref 45–117)
ALT SERPL-CCNC: 21 U/L (ref 12–78)
ANION GAP SERPL CALC-SCNC: 9 MMOL/L (ref 5–15)
AST SERPL-CCNC: 23 U/L (ref 15–37)
BASOPHILS # BLD: 0.1 K/UL (ref 0–0.1)
BASOPHILS NFR BLD: 1 % (ref 0–1)
BILIRUB DIRECT SERPL-MCNC: 0.3 MG/DL (ref 0–0.2)
BILIRUB SERPL-MCNC: 1.6 MG/DL (ref 0.2–1)
BUN SERPL-MCNC: 22 MG/DL (ref 6–20)
BUN/CREAT SERPL: 16 (ref 12–20)
CALCIUM SERPL-MCNC: 9 MG/DL (ref 8.5–10.1)
CHLORIDE SERPL-SCNC: 103 MMOL/L (ref 97–108)
CO2 SERPL-SCNC: 24 MMOL/L (ref 21–32)
CREAT SERPL-MCNC: 1.37 MG/DL (ref 0.55–1.02)
DIFFERENTIAL METHOD BLD: NORMAL
EOSINOPHIL # BLD: 0.2 K/UL (ref 0–0.4)
EOSINOPHIL NFR BLD: 2 % (ref 0–7)
ERYTHROCYTE [DISTWIDTH] IN BLOOD BY AUTOMATED COUNT: 14.2 % (ref 11.5–14.5)
GLOBULIN SER CALC-MCNC: 4 G/DL (ref 2–4)
GLUCOSE SERPL-MCNC: 93 MG/DL (ref 65–100)
HCT VFR BLD AUTO: 42.2 % (ref 35–47)
HGB BLD-MCNC: 13.7 G/DL (ref 11.5–16)
IMM GRANULOCYTES # BLD AUTO: 0 K/UL (ref 0–0.04)
IMM GRANULOCYTES NFR BLD AUTO: 0 % (ref 0–0.5)
LYMPHOCYTES # BLD: 3 K/UL (ref 0.8–3.5)
LYMPHOCYTES NFR BLD: 32 % (ref 12–49)
MAGNESIUM SERPL-MCNC: 2.1 MG/DL (ref 1.6–2.4)
MCH RBC QN AUTO: 29 PG (ref 26–34)
MCHC RBC AUTO-ENTMCNC: 32.5 G/DL (ref 30–36.5)
MCV RBC AUTO: 89.2 FL (ref 80–99)
MONOCYTES # BLD: 0.9 K/UL (ref 0–1)
MONOCYTES NFR BLD: 9 % (ref 5–13)
NEUTS SEG # BLD: 5.4 K/UL (ref 1.8–8)
NEUTS SEG NFR BLD: 56 % (ref 32–75)
NRBC # BLD: 0 K/UL (ref 0–0.01)
NRBC BLD-RTO: 0 PER 100 WBC
PHOSPHATE SERPL-MCNC: 5.2 MG/DL (ref 2.6–4.7)
PLATELET # BLD AUTO: 266 K/UL (ref 150–400)
PMV BLD AUTO: 10.4 FL (ref 8.9–12.9)
POTASSIUM SERPL-SCNC: 3.3 MMOL/L (ref 3.5–5.1)
PROT SERPL-MCNC: 7.4 G/DL (ref 6.4–8.2)
RBC # BLD AUTO: 4.73 M/UL (ref 3.8–5.2)
SODIUM SERPL-SCNC: 136 MMOL/L (ref 136–145)
WBC # BLD AUTO: 9.6 K/UL (ref 3.6–11)

## 2023-09-08 PROCEDURE — 36415 COLL VENOUS BLD VENIPUNCTURE: CPT

## 2023-09-08 PROCEDURE — 2580000003 HC RX 258: Performed by: INTERNAL MEDICINE

## 2023-09-08 PROCEDURE — 80076 HEPATIC FUNCTION PANEL: CPT

## 2023-09-08 PROCEDURE — 2500000003 HC RX 250 WO HCPCS: Performed by: INTERNAL MEDICINE

## 2023-09-08 PROCEDURE — 94618 PULMONARY STRESS TESTING: CPT

## 2023-09-08 PROCEDURE — 6360000002 HC RX W HCPCS: Performed by: INTERNAL MEDICINE

## 2023-09-08 PROCEDURE — 2500000003 HC RX 250 WO HCPCS: Performed by: NURSE PRACTITIONER

## 2023-09-08 PROCEDURE — 6370000000 HC RX 637 (ALT 250 FOR IP): Performed by: NURSE PRACTITIONER

## 2023-09-08 PROCEDURE — 85025 COMPLETE CBC W/AUTO DIFF WBC: CPT

## 2023-09-08 PROCEDURE — 83735 ASSAY OF MAGNESIUM: CPT

## 2023-09-08 PROCEDURE — 80069 RENAL FUNCTION PANEL: CPT

## 2023-09-08 RX ORDER — BUMETANIDE 1 MG/1
1 TABLET ORAL DAILY
Status: DISCONTINUED | OUTPATIENT
Start: 2023-09-09 | End: 2023-09-08 | Stop reason: HOSPADM

## 2023-09-08 RX ORDER — BUMETANIDE 1 MG/1
1 TABLET ORAL DAILY
Qty: 30 TABLET | Refills: 1 | Status: SHIPPED | OUTPATIENT
Start: 2023-09-09 | End: 2023-10-09

## 2023-09-08 RX ORDER — POTASSIUM CHLORIDE 750 MG/1
40 TABLET, FILM COATED, EXTENDED RELEASE ORAL ONCE
Status: COMPLETED | OUTPATIENT
Start: 2023-09-08 | End: 2023-09-08

## 2023-09-08 RX ORDER — POTASSIUM CHLORIDE 20 MEQ/1
20 TABLET, EXTENDED RELEASE ORAL DAILY
Qty: 30 TABLET | Refills: 0 | Status: SHIPPED | OUTPATIENT
Start: 2023-09-08 | End: 2023-10-08

## 2023-09-08 RX ADMIN — MICONAZOLE NITRATE: 2 POWDER TOPICAL at 09:15

## 2023-09-08 RX ADMIN — POTASSIUM CHLORIDE 40 MEQ: 750 TABLET, FILM COATED, EXTENDED RELEASE ORAL at 09:02

## 2023-09-08 RX ADMIN — ENOXAPARIN SODIUM 40 MG: 100 INJECTION SUBCUTANEOUS at 08:21

## 2023-09-08 RX ADMIN — BUMETANIDE 1 MG: 0.25 INJECTION INTRAMUSCULAR; INTRAVENOUS at 08:21

## 2023-09-08 RX ADMIN — SODIUM CHLORIDE, PRESERVATIVE FREE 10 ML: 5 INJECTION INTRAVENOUS at 08:23

## 2023-09-08 NOTE — PROGRESS NOTES
09/08/23 1150   Resting (Room Air)   SpO2 94   HR 94   During Walk (Room Air)   SpO2 92      Rate of Dyspnea 1   Symptoms Fatigue;Dizziness;Leg pain   Comments pt walked in hallway on room air. unsteady and dizzy while walking requiring SBA.    After Walk   SpO2 92      Does the Patient Qualify for Home O2 No

## 2023-09-08 NOTE — PLAN OF CARE
Problem: Chronic Conditions and Co-morbidities  Goal: Patient's chronic conditions and co-morbidity symptoms are monitored and maintained or improved  Outcome: Progressing     Problem: Skin/Tissue Integrity  Goal: Absence of new skin breakdown  Description: 1. Monitor for areas of redness and/or skin breakdown  2. Assess vascular access sites hourly  3. Every 4-6 hours minimum:  Change oxygen saturation probe site  4. Every 4-6 hours:  If on nasal continuous positive airway pressure, respiratory therapy assess nares and determine need for appliance change or resting period.   Outcome: Progressing     Problem: Respiratory - Adult  Goal: Achieves optimal ventilation and oxygenation  Outcome: Progressing

## 2023-09-08 NOTE — CARE COORDINATION
9/8/2023  12:40 PM  Pt emergently admitted 9/6/23 for suspected CHF, pericardial effusion, cardiology and pulmonary following, DC order noted,   Respiratory has completed oximetry, pt does not qualify for home O2, however during walk pt dizzy, unsteady requiring SBA.    At baseline pt is independent w/ ADLs,   her spouse is currently working out of state, has friends locally,  Pt is planning to transport herself home   CM will follow for any noted DC needs,   [facility] resources added to AVS   [facility] [facility]

## 2023-09-08 NOTE — PROGRESS NOTES
1411 Patient getting discharged to home , going to drive herself home. Educated discharge instructions and given a chance to ask questions. IV removed and patient going home.

## 2023-09-11 ENCOUNTER — TELEPHONE (OUTPATIENT)
Facility: HOSPITAL | Age: 53
End: 2023-09-11

## 2023-09-27 ENCOUNTER — TELEPHONE (OUTPATIENT)
Age: 53
End: 2023-09-27

## 2023-11-06 ENCOUNTER — APPOINTMENT (OUTPATIENT)
Facility: HOSPITAL | Age: 53
DRG: 865 | End: 2023-11-06
Payer: COMMERCIAL

## 2023-11-06 ENCOUNTER — HOSPITAL ENCOUNTER (INPATIENT)
Facility: HOSPITAL | Age: 53
LOS: 8 days | Discharge: HOME | DRG: 865 | End: 2023-11-14
Attending: EMERGENCY MEDICINE | Admitting: STUDENT IN AN ORGANIZED HEALTH CARE EDUCATION/TRAINING PROGRAM
Payer: COMMERCIAL

## 2023-11-06 DIAGNOSIS — R07.9 CHEST PAIN: ICD-10-CM

## 2023-11-06 DIAGNOSIS — N17.9 ACUTE KIDNEY INJURY (HCC): ICD-10-CM

## 2023-11-06 DIAGNOSIS — R06.02 SHORTNESS OF BREATH: ICD-10-CM

## 2023-11-06 DIAGNOSIS — R07.9 CHEST PAIN, UNSPECIFIED TYPE: Primary | ICD-10-CM

## 2023-11-06 DIAGNOSIS — R09.02 HYPOXIA: ICD-10-CM

## 2023-11-06 DIAGNOSIS — I50.32 CHF (CONGESTIVE HEART FAILURE), NYHA CLASS I, CHRONIC, DIASTOLIC (HCC): ICD-10-CM

## 2023-11-06 LAB
ALBUMIN SERPL-MCNC: 3.6 G/DL (ref 3.5–5)
ALBUMIN/GLOB SERPL: 1.2 (ref 1.1–2.2)
ALP SERPL-CCNC: 13 U/L (ref 45–117)
ALT SERPL-CCNC: 33 U/L (ref 12–78)
ANION GAP SERPL CALC-SCNC: 9 MMOL/L (ref 5–15)
APPEARANCE UR: ABNORMAL
AST SERPL-CCNC: 33 U/L (ref 15–37)
BACTERIA URNS QL MICRO: ABNORMAL /HPF
BASOPHILS # BLD: 0.1 K/UL (ref 0–0.1)
BASOPHILS NFR BLD: 1 % (ref 0–1)
BILIRUB SERPL-MCNC: 3.2 MG/DL (ref 0.2–1)
BILIRUB UR QL CFM: POSITIVE
BUN SERPL-MCNC: 18 MG/DL (ref 6–20)
BUN/CREAT SERPL: 11 (ref 12–20)
CALCIUM SERPL-MCNC: 9.6 MG/DL (ref 8.5–10.1)
CHLORIDE SERPL-SCNC: 114 MMOL/L (ref 97–108)
CO2 SERPL-SCNC: 22 MMOL/L (ref 21–32)
COLOR UR: ABNORMAL
CREAT SERPL-MCNC: 1.7 MG/DL (ref 0.55–1.02)
D DIMER PPP FEU-MCNC: 1.51 MG/L FEU (ref 0–0.65)
DIFFERENTIAL METHOD BLD: ABNORMAL
EOSINOPHIL # BLD: 0 K/UL (ref 0–0.4)
EOSINOPHIL NFR BLD: 0 % (ref 0–7)
EPITH CASTS URNS QL MICRO: ABNORMAL /LPF
ERYTHROCYTE [DISTWIDTH] IN BLOOD BY AUTOMATED COUNT: 15.9 % (ref 11.5–14.5)
GLOBULIN SER CALC-MCNC: 3.1 G/DL (ref 2–4)
GLUCOSE SERPL-MCNC: 108 MG/DL (ref 65–100)
GLUCOSE UR STRIP.AUTO-MCNC: NEGATIVE MG/DL
HCT VFR BLD AUTO: 43.4 % (ref 35–47)
HGB BLD-MCNC: 13.7 G/DL (ref 11.5–16)
HGB UR QL STRIP: ABNORMAL
HYALINE CASTS URNS QL MICRO: ABNORMAL /LPF (ref 0–5)
IMM GRANULOCYTES # BLD AUTO: 0 K/UL (ref 0–0.04)
IMM GRANULOCYTES NFR BLD AUTO: 0 % (ref 0–0.5)
KETONES UR QL STRIP.AUTO: NEGATIVE MG/DL
LEUKOCYTE ESTERASE UR QL STRIP.AUTO: ABNORMAL
LIPASE SERPL-CCNC: 33 U/L (ref 13–75)
LYMPHOCYTES # BLD: 1.2 K/UL (ref 0.8–3.5)
LYMPHOCYTES NFR BLD: 13 % (ref 12–49)
MCH RBC QN AUTO: 29.5 PG (ref 26–34)
MCHC RBC AUTO-ENTMCNC: 31.6 G/DL (ref 30–36.5)
MCV RBC AUTO: 93.5 FL (ref 80–99)
MONOCYTES # BLD: 0.5 K/UL (ref 0–1)
MONOCYTES NFR BLD: 6 % (ref 5–13)
NEUTS SEG # BLD: 7.2 K/UL (ref 1.8–8)
NEUTS SEG NFR BLD: 80 % (ref 32–75)
NITRITE UR QL STRIP.AUTO: NEGATIVE
NRBC # BLD: 0 K/UL (ref 0–0.01)
NRBC BLD-RTO: 0 PER 100 WBC
NT PRO BNP: ABNORMAL PG/ML
PH UR STRIP: 5.5 (ref 5–8)
PLATELET # BLD AUTO: 187 K/UL (ref 150–400)
PMV BLD AUTO: 11.5 FL (ref 8.9–12.9)
POTASSIUM SERPL-SCNC: 4.3 MMOL/L (ref 3.5–5.1)
PROT SERPL-MCNC: 6.7 G/DL (ref 6.4–8.2)
PROT UR STRIP-MCNC: 30 MG/DL
RBC # BLD AUTO: 4.64 M/UL (ref 3.8–5.2)
RBC #/AREA URNS HPF: ABNORMAL /HPF (ref 0–5)
SODIUM SERPL-SCNC: 145 MMOL/L (ref 136–145)
SP GR UR REFRACTOMETRY: 1.02 (ref 1–1.03)
TROPONIN I SERPL HS-MCNC: 36 NG/L (ref 0–51)
TROPONIN I SERPL HS-MCNC: 48 NG/L (ref 0–51)
UROBILINOGEN UR QL STRIP.AUTO: 4 EU/DL (ref 0.2–1)
WBC # BLD AUTO: 9 K/UL (ref 3.6–11)
WBC URNS QL MICRO: >100 /HPF (ref 0–4)
YEAST URNS QL MICRO: PRESENT

## 2023-11-06 PROCEDURE — 2580000003 HC RX 258: Performed by: STUDENT IN AN ORGANIZED HEALTH CARE EDUCATION/TRAINING PROGRAM

## 2023-11-06 PROCEDURE — 80053 COMPREHEN METABOLIC PANEL: CPT

## 2023-11-06 PROCEDURE — 1100000000 HC RM PRIVATE

## 2023-11-06 PROCEDURE — 81001 URINALYSIS AUTO W/SCOPE: CPT

## 2023-11-06 PROCEDURE — 84484 ASSAY OF TROPONIN QUANT: CPT

## 2023-11-06 PROCEDURE — 71046 X-RAY EXAM CHEST 2 VIEWS: CPT

## 2023-11-06 PROCEDURE — 94761 N-INVAS EAR/PLS OXIMETRY MLT: CPT

## 2023-11-06 PROCEDURE — 93005 ELECTROCARDIOGRAM TRACING: CPT | Performed by: INTERNAL MEDICINE

## 2023-11-06 PROCEDURE — 99285 EMERGENCY DEPT VISIT HI MDM: CPT

## 2023-11-06 PROCEDURE — 76705 ECHO EXAM OF ABDOMEN: CPT

## 2023-11-06 PROCEDURE — 2700000000 HC OXYGEN THERAPY PER DAY

## 2023-11-06 PROCEDURE — 85379 FIBRIN DEGRADATION QUANT: CPT

## 2023-11-06 PROCEDURE — 83690 ASSAY OF LIPASE: CPT

## 2023-11-06 PROCEDURE — 83880 ASSAY OF NATRIURETIC PEPTIDE: CPT

## 2023-11-06 PROCEDURE — 6370000000 HC RX 637 (ALT 250 FOR IP): Performed by: STUDENT IN AN ORGANIZED HEALTH CARE EDUCATION/TRAINING PROGRAM

## 2023-11-06 PROCEDURE — 85025 COMPLETE CBC W/AUTO DIFF WBC: CPT

## 2023-11-06 PROCEDURE — 36415 COLL VENOUS BLD VENIPUNCTURE: CPT

## 2023-11-06 RX ORDER — ACETAMINOPHEN 650 MG/1
650 SUPPOSITORY RECTAL EVERY 6 HOURS PRN
Status: DISCONTINUED | OUTPATIENT
Start: 2023-11-06 | End: 2023-11-14 | Stop reason: HOSPADM

## 2023-11-06 RX ORDER — OXYCODONE HYDROCHLORIDE 5 MG/1
5 TABLET ORAL EVERY 4 HOURS PRN
Status: DISCONTINUED | OUTPATIENT
Start: 2023-11-06 | End: 2023-11-14 | Stop reason: HOSPADM

## 2023-11-06 RX ORDER — ONDANSETRON 4 MG/1
4 TABLET, ORALLY DISINTEGRATING ORAL EVERY 8 HOURS PRN
Status: DISCONTINUED | OUTPATIENT
Start: 2023-11-06 | End: 2023-11-14 | Stop reason: HOSPADM

## 2023-11-06 RX ORDER — SODIUM CHLORIDE 0.9 % (FLUSH) 0.9 %
5-40 SYRINGE (ML) INJECTION PRN
Status: DISCONTINUED | OUTPATIENT
Start: 2023-11-06 | End: 2023-11-14 | Stop reason: HOSPADM

## 2023-11-06 RX ORDER — ENOXAPARIN SODIUM 100 MG/ML
40 INJECTION SUBCUTANEOUS DAILY
Status: DISCONTINUED | OUTPATIENT
Start: 2023-11-07 | End: 2023-11-06

## 2023-11-06 RX ORDER — SODIUM CHLORIDE 0.9 % (FLUSH) 0.9 %
5-40 SYRINGE (ML) INJECTION EVERY 12 HOURS SCHEDULED
Status: DISCONTINUED | OUTPATIENT
Start: 2023-11-06 | End: 2023-11-14 | Stop reason: HOSPADM

## 2023-11-06 RX ORDER — BUPROPION HYDROCHLORIDE 150 MG/1
150 TABLET ORAL DAILY
Status: DISCONTINUED | OUTPATIENT
Start: 2023-11-06 | End: 2023-11-14 | Stop reason: HOSPADM

## 2023-11-06 RX ORDER — ACETAMINOPHEN 325 MG/1
650 TABLET ORAL EVERY 6 HOURS PRN
Status: DISCONTINUED | OUTPATIENT
Start: 2023-11-06 | End: 2023-11-14 | Stop reason: HOSPADM

## 2023-11-06 RX ORDER — ONDANSETRON 2 MG/ML
4 INJECTION INTRAMUSCULAR; INTRAVENOUS EVERY 6 HOURS PRN
Status: DISCONTINUED | OUTPATIENT
Start: 2023-11-06 | End: 2023-11-14 | Stop reason: HOSPADM

## 2023-11-06 RX ORDER — SODIUM CHLORIDE 9 MG/ML
INJECTION, SOLUTION INTRAVENOUS CONTINUOUS
Status: DISCONTINUED | OUTPATIENT
Start: 2023-11-06 | End: 2023-11-09

## 2023-11-06 RX ORDER — POLYETHYLENE GLYCOL 3350 17 G/17G
17 POWDER, FOR SOLUTION ORAL DAILY PRN
Status: DISCONTINUED | OUTPATIENT
Start: 2023-11-06 | End: 2023-11-14 | Stop reason: HOSPADM

## 2023-11-06 RX ORDER — SODIUM CHLORIDE 9 MG/ML
INJECTION, SOLUTION INTRAVENOUS PRN
Status: DISCONTINUED | OUTPATIENT
Start: 2023-11-06 | End: 2023-11-14 | Stop reason: HOSPADM

## 2023-11-06 RX ORDER — HYDROMORPHONE HYDROCHLORIDE 1 MG/ML
1 INJECTION, SOLUTION INTRAMUSCULAR; INTRAVENOUS; SUBCUTANEOUS EVERY 4 HOURS PRN
Status: DISCONTINUED | OUTPATIENT
Start: 2023-11-06 | End: 2023-11-14 | Stop reason: HOSPADM

## 2023-11-06 RX ORDER — BUSPIRONE HYDROCHLORIDE 5 MG/1
10 TABLET ORAL 3 TIMES DAILY
Status: DISCONTINUED | OUTPATIENT
Start: 2023-11-06 | End: 2023-11-14 | Stop reason: HOSPADM

## 2023-11-06 RX ADMIN — BUSPIRONE HYDROCHLORIDE 10 MG: 10 TABLET ORAL at 21:43

## 2023-11-06 RX ADMIN — SODIUM CHLORIDE, PRESERVATIVE FREE 10 ML: 5 INJECTION INTRAVENOUS at 21:44

## 2023-11-06 ASSESSMENT — ENCOUNTER SYMPTOMS
COLOR CHANGE: 0
VOMITING: 0
EYE REDNESS: 0
COUGH: 0
EYE PAIN: 0
SHORTNESS OF BREATH: 1
NAUSEA: 0
SINUS PRESSURE: 0
SINUS PAIN: 0
ABDOMINAL PAIN: 0
ABDOMINAL DISTENTION: 0

## 2023-11-06 ASSESSMENT — PAIN - FUNCTIONAL ASSESSMENT
PAIN_FUNCTIONAL_ASSESSMENT: 0-10
PAIN_FUNCTIONAL_ASSESSMENT: NONE - DENIES PAIN

## 2023-11-06 ASSESSMENT — PAIN SCALES - GENERAL: PAINLEVEL_OUTOF10: 3

## 2023-11-06 NOTE — ED NOTES
Waiting room round completed, no signs of distress, oxygen tank in use.       Morena Marrufo RN  11/06/23 7759

## 2023-11-07 ENCOUNTER — APPOINTMENT (OUTPATIENT)
Facility: HOSPITAL | Age: 53
DRG: 865 | End: 2023-11-07
Payer: COMMERCIAL

## 2023-11-07 ENCOUNTER — APPOINTMENT (OUTPATIENT)
Facility: HOSPITAL | Age: 53
DRG: 865 | End: 2023-11-07
Attending: INTERNAL MEDICINE
Payer: COMMERCIAL

## 2023-11-07 PROBLEM — J44.9 COPD (CHRONIC OBSTRUCTIVE PULMONARY DISEASE) (HCC): Status: ACTIVE | Noted: 2023-11-07

## 2023-11-07 PROBLEM — E88.09 HYPOALBUMINEMIA: Status: ACTIVE | Noted: 2023-11-07

## 2023-11-07 PROBLEM — R82.81 PYURIA: Status: ACTIVE | Noted: 2023-11-07

## 2023-11-07 PROBLEM — M25.512 CHRONIC LEFT SHOULDER PAIN: Status: RESOLVED | Noted: 2022-07-22 | Resolved: 2023-11-07

## 2023-11-07 PROBLEM — F32.A ANXIETY AND DEPRESSION: Status: ACTIVE | Noted: 2023-11-07

## 2023-11-07 PROBLEM — I50.32 CHF (CONGESTIVE HEART FAILURE), NYHA CLASS I, CHRONIC, DIASTOLIC (HCC): Status: ACTIVE | Noted: 2023-11-07

## 2023-11-07 PROBLEM — J43.2 CENTRILOBULAR EMPHYSEMA (HCC): Status: RESOLVED | Noted: 2023-09-06 | Resolved: 2023-11-07

## 2023-11-07 PROBLEM — J96.01 ACUTE RESPIRATORY FAILURE WITH HYPOXIA (HCC): Status: ACTIVE | Noted: 2023-11-07

## 2023-11-07 PROBLEM — G89.29 CHRONIC LEFT SHOULDER PAIN: Status: RESOLVED | Noted: 2022-07-22 | Resolved: 2023-11-07

## 2023-11-07 PROBLEM — F41.9 ANXIETY AND DEPRESSION: Status: ACTIVE | Noted: 2023-11-07

## 2023-11-07 PROBLEM — Z98.84 GASTRIC BYPASS STATUS FOR OBESITY: Status: ACTIVE | Noted: 2023-11-07

## 2023-11-07 PROBLEM — N18.30 CKD (CHRONIC KIDNEY DISEASE) STAGE 3, GFR 30-59 ML/MIN (HCC): Status: ACTIVE | Noted: 2023-11-07

## 2023-11-07 PROBLEM — I50.31 ACUTE HEART FAILURE WITH PRESERVED EJECTION FRACTION (HFPEF) (HCC): Status: RESOLVED | Noted: 2023-09-08 | Resolved: 2023-11-07

## 2023-11-07 LAB
ALBUMIN SERPL-MCNC: 3 G/DL (ref 3.5–5)
ALBUMIN/GLOB SERPL: 0.9 (ref 1.1–2.2)
ALP SERPL-CCNC: 11 U/L (ref 45–117)
ALT SERPL-CCNC: 30 U/L (ref 12–78)
ANION GAP SERPL CALC-SCNC: 9 MMOL/L (ref 5–15)
APPEARANCE UR: CLEAR
AST SERPL-CCNC: 37 U/L (ref 15–37)
B PERT DNA SPEC QL NAA+PROBE: NOT DETECTED
BILIRUB DIRECT SERPL-MCNC: 0.2 MG/DL (ref 0–0.2)
BILIRUB SERPL-MCNC: 3.1 MG/DL (ref 0.2–1)
BILIRUB UR QL: NEGATIVE
BORDETELLA PARAPERTUSSIS BY PCR: NOT DETECTED
BUN SERPL-MCNC: 16 MG/DL (ref 6–20)
BUN/CREAT SERPL: 11 (ref 12–20)
C PNEUM DNA SPEC QL NAA+PROBE: NOT DETECTED
CALCIUM SERPL-MCNC: 8.6 MG/DL (ref 8.5–10.1)
CHLORIDE SERPL-SCNC: 113 MMOL/L (ref 97–108)
CO2 SERPL-SCNC: 18 MMOL/L (ref 21–32)
COLOR UR: NORMAL
COMMENT:: NORMAL
CREAT SERPL-MCNC: 1.47 MG/DL (ref 0.55–1.02)
ECHO BSA: 1.88 M2
ECHO EST RA PRESSURE: 15 MMHG
ECHO LA DIAMETER INDEX: 1.52 CM/M2
ECHO LA DIAMETER: 2.8 CM
ECHO LV FRACTIONAL SHORTENING: 45 % (ref 28–44)
ECHO LV INTERNAL DIMENSION DIASTOLE INDEX: 1.68 CM/M2
ECHO LV INTERNAL DIMENSION DIASTOLIC: 3.1 CM (ref 3.9–5.3)
ECHO LV INTERNAL DIMENSION SYSTOLIC INDEX: 0.92 CM/M2
ECHO LV INTERNAL DIMENSION SYSTOLIC: 1.7 CM
ECHO LV IVSD: 1 CM (ref 0.6–0.9)
ECHO LV MASS 2D: 99.7 G (ref 67–162)
ECHO LV MASS INDEX 2D: 54.2 G/M2 (ref 43–95)
ECHO LV POSTERIOR WALL DIASTOLIC: 1.2 CM (ref 0.6–0.9)
ECHO LV RELATIVE WALL THICKNESS RATIO: 0.77
ECHO LVOT AREA: 2 CM2
ECHO LVOT DIAM: 1.6 CM
ECHO RIGHT VENTRICULAR SYSTOLIC PRESSURE (RVSP): 72 MMHG
ECHO RV TAPSE: 1.9 CM (ref 1.7–?)
ECHO TV REGURGITANT MAX VELOCITY: 3.77 M/S
ECHO TV REGURGITANT PEAK GRADIENT: 57 MMHG
EKG ATRIAL RATE: 94 BPM
EKG DIAGNOSIS: NORMAL
EKG P AXIS: 69 DEGREES
EKG P-R INTERVAL: 148 MS
EKG Q-T INTERVAL: 366 MS
EKG QRS DURATION: 76 MS
EKG QTC CALCULATION (BAZETT): 457 MS
EKG R AXIS: 118 DEGREES
EKG T AXIS: -19 DEGREES
EKG VENTRICULAR RATE: 94 BPM
FLUAV H1 2009 PAND RNA SPEC QL NAA+PROBE: NOT DETECTED
FLUAV H1 RNA SPEC QL NAA+PROBE: NOT DETECTED
FLUAV H3 RNA SPEC QL NAA+PROBE: NOT DETECTED
FLUAV SUBTYP SPEC NAA+PROBE: NOT DETECTED
FLUBV RNA SPEC QL NAA+PROBE: NOT DETECTED
GLOBULIN SER CALC-MCNC: 3.3 G/DL (ref 2–4)
GLUCOSE SERPL-MCNC: 92 MG/DL (ref 65–100)
GLUCOSE UR STRIP.AUTO-MCNC: NEGATIVE MG/DL
HADV DNA SPEC QL NAA+PROBE: NOT DETECTED
HCOV 229E RNA SPEC QL NAA+PROBE: NOT DETECTED
HCOV HKU1 RNA SPEC QL NAA+PROBE: NOT DETECTED
HCOV NL63 RNA SPEC QL NAA+PROBE: NOT DETECTED
HCOV OC43 RNA SPEC QL NAA+PROBE: NOT DETECTED
HGB UR QL STRIP: NEGATIVE
HMPV RNA SPEC QL NAA+PROBE: NOT DETECTED
HPIV1 RNA SPEC QL NAA+PROBE: NOT DETECTED
HPIV2 RNA SPEC QL NAA+PROBE: NOT DETECTED
HPIV3 RNA SPEC QL NAA+PROBE: NOT DETECTED
HPIV4 RNA SPEC QL NAA+PROBE: NOT DETECTED
KETONES UR QL STRIP.AUTO: NEGATIVE MG/DL
LEUKOCYTE ESTERASE UR QL STRIP.AUTO: NEGATIVE
M PNEUMO DNA SPEC QL NAA+PROBE: NOT DETECTED
NITRITE UR QL STRIP.AUTO: NEGATIVE
PH UR STRIP: 5.5 (ref 5–8)
POTASSIUM SERPL-SCNC: 3.6 MMOL/L (ref 3.5–5.1)
PROCALCITONIN SERPL-MCNC: <0.05 NG/ML
PROT SERPL-MCNC: 6.3 G/DL (ref 6.4–8.2)
PROT UR STRIP-MCNC: NEGATIVE MG/DL
RSV RNA SPEC QL NAA+PROBE: NOT DETECTED
RV+EV RNA SPEC QL NAA+PROBE: DETECTED
SARS-COV-2 RNA RESP QL NAA+PROBE: NOT DETECTED
SODIUM SERPL-SCNC: 140 MMOL/L (ref 136–145)
SP GR UR REFRACTOMETRY: 1.01 (ref 1–1.03)
SPECIMEN HOLD: NORMAL
UROBILINOGEN UR QL STRIP.AUTO: 0.2 EU/DL (ref 0.2–1)

## 2023-11-07 PROCEDURE — 6370000000 HC RX 637 (ALT 250 FOR IP): Performed by: INTERNAL MEDICINE

## 2023-11-07 PROCEDURE — 2700000000 HC OXYGEN THERAPY PER DAY

## 2023-11-07 PROCEDURE — 93321 DOPPLER ECHO F-UP/LMTD STD: CPT | Performed by: INTERNAL MEDICINE

## 2023-11-07 PROCEDURE — 6360000004 HC RX CONTRAST MEDICATION: Performed by: INTERNAL MEDICINE

## 2023-11-07 PROCEDURE — 94664 DEMO&/EVAL PT USE INHALER: CPT

## 2023-11-07 PROCEDURE — 93308 TTE F-UP OR LMTD: CPT | Performed by: INTERNAL MEDICINE

## 2023-11-07 PROCEDURE — 71275 CT ANGIOGRAPHY CHEST: CPT

## 2023-11-07 PROCEDURE — 2580000003 HC RX 258: Performed by: STUDENT IN AN ORGANIZED HEALTH CARE EDUCATION/TRAINING PROGRAM

## 2023-11-07 PROCEDURE — 2500000003 HC RX 250 WO HCPCS: Performed by: NURSE PRACTITIONER

## 2023-11-07 PROCEDURE — 93308 TTE F-UP OR LMTD: CPT

## 2023-11-07 PROCEDURE — 94761 N-INVAS EAR/PLS OXIMETRY MLT: CPT

## 2023-11-07 PROCEDURE — 93325 DOPPLER ECHO COLOR FLOW MAPG: CPT | Performed by: INTERNAL MEDICINE

## 2023-11-07 PROCEDURE — 99223 1ST HOSP IP/OBS HIGH 75: CPT | Performed by: INTERNAL MEDICINE

## 2023-11-07 PROCEDURE — 6370000000 HC RX 637 (ALT 250 FOR IP): Performed by: STUDENT IN AN ORGANIZED HEALTH CARE EDUCATION/TRAINING PROGRAM

## 2023-11-07 PROCEDURE — A4216 STERILE WATER/SALINE, 10 ML: HCPCS | Performed by: STUDENT IN AN ORGANIZED HEALTH CARE EDUCATION/TRAINING PROGRAM

## 2023-11-07 PROCEDURE — 80076 HEPATIC FUNCTION PANEL: CPT

## 2023-11-07 PROCEDURE — 6360000002 HC RX W HCPCS: Performed by: STUDENT IN AN ORGANIZED HEALTH CARE EDUCATION/TRAINING PROGRAM

## 2023-11-07 PROCEDURE — 84145 PROCALCITONIN (PCT): CPT

## 2023-11-07 PROCEDURE — 81002 URINALYSIS NONAUTO W/O SCOPE: CPT

## 2023-11-07 PROCEDURE — 2060000000 HC ICU INTERMEDIATE R&B

## 2023-11-07 PROCEDURE — 80048 BASIC METABOLIC PNL TOTAL CA: CPT

## 2023-11-07 PROCEDURE — 1100000000 HC RM PRIVATE

## 2023-11-07 PROCEDURE — 94640 AIRWAY INHALATION TREATMENT: CPT

## 2023-11-07 PROCEDURE — 36415 COLL VENOUS BLD VENIPUNCTURE: CPT

## 2023-11-07 PROCEDURE — 0202U NFCT DS 22 TRGT SARS-COV-2: CPT

## 2023-11-07 PROCEDURE — C9113 INJ PANTOPRAZOLE SODIUM, VIA: HCPCS | Performed by: STUDENT IN AN ORGANIZED HEALTH CARE EDUCATION/TRAINING PROGRAM

## 2023-11-07 PROCEDURE — 87086 URINE CULTURE/COLONY COUNT: CPT

## 2023-11-07 RX ORDER — BUDESONIDE AND FORMOTEROL FUMARATE DIHYDRATE 80; 4.5 UG/1; UG/1
2 AEROSOL RESPIRATORY (INHALATION)
Status: DISCONTINUED | OUTPATIENT
Start: 2023-11-07 | End: 2023-11-07

## 2023-11-07 RX ORDER — IPRATROPIUM BROMIDE AND ALBUTEROL SULFATE 2.5; .5 MG/3ML; MG/3ML
1 SOLUTION RESPIRATORY (INHALATION) EVERY 6 HOURS PRN
Status: DISCONTINUED | OUTPATIENT
Start: 2023-11-07 | End: 2023-11-14 | Stop reason: HOSPADM

## 2023-11-07 RX ORDER — ALBUTEROL SULFATE 90 UG/1
2 AEROSOL, METERED RESPIRATORY (INHALATION) EVERY 6 HOURS PRN
Status: DISCONTINUED | OUTPATIENT
Start: 2023-11-07 | End: 2023-11-07

## 2023-11-07 RX ORDER — FLUOXETINE HYDROCHLORIDE 20 MG/1
40 CAPSULE ORAL DAILY
Status: DISCONTINUED | OUTPATIENT
Start: 2023-11-07 | End: 2023-11-14 | Stop reason: HOSPADM

## 2023-11-07 RX ORDER — BUMETANIDE 0.25 MG/ML
1 INJECTION INTRAMUSCULAR; INTRAVENOUS ONCE
Status: COMPLETED | OUTPATIENT
Start: 2023-11-07 | End: 2023-11-07

## 2023-11-07 RX ORDER — BUMETANIDE 1 MG/1
1 TABLET ORAL DAILY
Status: DISCONTINUED | OUTPATIENT
Start: 2023-11-07 | End: 2023-11-12

## 2023-11-07 RX ADMIN — POTASSIUM BICARBONATE 20 MEQ: 782 TABLET, EFFERVESCENT ORAL at 10:13

## 2023-11-07 RX ADMIN — SODIUM CHLORIDE, PRESERVATIVE FREE 10 ML: 5 INJECTION INTRAVENOUS at 08:26

## 2023-11-07 RX ADMIN — BUPROPION HYDROCHLORIDE 150 MG: 150 TABLET, EXTENDED RELEASE ORAL at 08:23

## 2023-11-07 RX ADMIN — BUSPIRONE HYDROCHLORIDE 10 MG: 10 TABLET ORAL at 08:23

## 2023-11-07 RX ADMIN — BUMETANIDE 1 MG: 0.25 INJECTION INTRAMUSCULAR; INTRAVENOUS at 05:45

## 2023-11-07 RX ADMIN — SODIUM CHLORIDE, PRESERVATIVE FREE 10 ML: 5 INJECTION INTRAVENOUS at 22:15

## 2023-11-07 RX ADMIN — BUSPIRONE HYDROCHLORIDE 10 MG: 10 TABLET ORAL at 13:55

## 2023-11-07 RX ADMIN — MOMETASONE FUROATE AND FORMOTEROL FUMARATE DIHYDRATE 2 PUFF: 100; 5 AEROSOL RESPIRATORY (INHALATION) at 11:05

## 2023-11-07 RX ADMIN — BUMETANIDE 1 MG: 1 TABLET ORAL at 10:13

## 2023-11-07 RX ADMIN — BUSPIRONE HYDROCHLORIDE 10 MG: 10 TABLET ORAL at 22:15

## 2023-11-07 RX ADMIN — FLUOXETINE 40 MG: 20 CAPSULE ORAL at 10:13

## 2023-11-07 RX ADMIN — SODIUM CHLORIDE, PRESERVATIVE FREE 40 MG: 5 INJECTION INTRAVENOUS at 08:23

## 2023-11-07 RX ADMIN — IOPAMIDOL 100 ML: 755 INJECTION, SOLUTION INTRAVENOUS at 16:20

## 2023-11-07 NOTE — CONSULTS
[address] [facility]                    Cardiology Care Note     [x]Initial Encounter     []Follow-up    Patient Name: Gloria Meehan - BXZ:[id number]  Primary Cardiologist: Enrrique Medrano MD  Consulting Cardiologist: Tamar Peabody, MD     Reason for encounter:    HPI:   Sandy Mehta is a 48 y.o. female with PMH significant for HFpEF, COPD, Obesity s/p gastric bypass presenting with chest pain, non radiating, anterior chest wall and abdominal wall, associated with SOB. Moderate intensity pain. Trop eval was normal.     EKG non ischemic. Most recent HS troponins:  Recent Labs     11/06/23  1045 11/06/23  2123   TROPHS 36 48        Assessment and Plan     Chest pain: Will proceed with Lexiscan stress nuc when her O2 requirement decreases to less than 4L/min  HFpEF: Bumex per BP tolerance. Obesity       ____________________________________________________________    Cardiac testing  09/05/23    ECHO (TTE) COMPLETE (CONTRAST/BUBBLE/3D PRN) 09/06/2023 12:56 PM (Final)    Interpretation Summary    Left Ventricle: Normal left ventricular systolic function with a visually estimated EF of 60 - 65%. Not well visualized. Left ventricle is smaller than normal. Normal wall thickness. Normal wall motion. Normal diastolic function. Right Ventricle: Moderately reduced systolic function. Pericardium: Moderate (1-2 cm) localized pericardial effusion present around the posterior and left ventricle. Signed by: Brad Kirkland DO on 9/6/2023 12:56 PM    No results found for this or any previous visit. No results found for this or any previous visit.       Review of Systems:    [x]All other systems reviewed and all negative except as written in HPI    [] Patient unable to provide secondary to condition    Past Medical History:   Diagnosis Date    Anxiety and depression     CHF (congestive heart failure), NYHA class I, chronic, diastolic (HCC)     CKD (chronic kidney disease) stage 3, GFR above. Lab Results   Component Value Date/Time     11/07/2023 02:06 AM    K 3.6 11/07/2023 02:06 AM     11/07/2023 02:06 AM    CO2 18 11/07/2023 02:06 AM    BUN 16 11/07/2023 02:06 AM    CREATININE 1.47 11/07/2023 02:06 AM    GLUCOSE 92 11/07/2023 02:06 AM    CALCIUM 8.6 11/07/2023 02:06 AM    LABGLOM 43 11/07/2023 02:06 AM      No results found for: \"BNP\"  Lab Results   Component Value Date    WBC 9.0 11/06/2023    HGB 13.7 11/06/2023    HCT 43.4 11/06/2023    MCV 93.5 11/06/2023     11/06/2023     No results for input(s): \"CHOL\", \"HDLC\", \"LDLC\", \"HBA1C\" in the last 72 hours. Invalid input(s):  \"TGL\"    Current meds:    Current Facility-Administered Medications:     FLUoxetine (PROZAC) capsule 40 mg, 40 mg, Oral, Daily, Smith Avendaño MD, 40 mg at 11/07/23 1013    bumetanide (BUMEX) tablet 1 mg, 1 mg, Oral, Daily, Smith Avendaño MD, 1 mg at 11/07/23 1013    ipratropium 0.5 mg-albuterol 2.5 mg (DUONEB) nebulizer solution 1 Dose, 1 Dose, Inhalation, Q6H PRN, Smith Avendaño MD    potassium bicarb-citric acid (EFFER-K) effervescent tablet 20 mEq, 20 mEq, Oral, Daily, Smith Avendaño MD, 20 mEq at 11/07/23 1013    mometasone-formoterol (DULERA) 100-5 MCG/ACT inhaler 2 puff, 2 puff, Inhalation, BID RT, Smith Avendaño MD, 2 puff at 11/07/23 1105    albuterol sulfate HFA (PROVENTIL;VENTOLIN;PROAIR) 108 (90 Base) MCG/ACT inhaler 2 puff, 2 puff, Inhalation, Q6H PRN, Smith Avendaño MD    sodium chloride flush 0.9 % injection 5-40 mL, 5-40 mL, IntraVENous, 2 times per day, Armondsarahi Quijano MD, 10 mL at 11/07/23 0826    sodium chloride flush 0.9 % injection 5-40 mL, 5-40 mL, IntraVENous, PRN, Ochoa Lake MD    0.9 % sodium chloride infusion, , IntraVENous, PRN, Ochoa Lake MD    ondansetron (ZOFRAN-ODT) disintegrating tablet 4 mg, 4 mg, Oral, Q8H PRN **OR** ondansetron (ZOFRAN) injection 4 mg, 4 mg, IntraVENous, Q6H PRN, Ochoa Lake MD    polyethylene glycol (GLYCOLAX) packet 17

## 2023-11-07 NOTE — PROGRESS NOTES
Found patient in restroom incontinent of stool. Helped the patient with changing her clothing and applying a brief. Patient began to look pale and became short of breath. Informed patient to sit on the commode. After a few minutes. Patient was able to walk back to her hallway recliner in the ED. Oxygen levels noted to be in the 86-87% on two litters. Increased oxygen to 4L. Oxygen levels continues to level out in the upper 80's. Increased to 6L. Oxygen levels noted to increase. Noted to be at 94%. 0150 Informed rapid response nurse of the concerns. He came down to assess the patient. [address] [facility] Saúl Barrett NP of the concerns. [address] morning labs. Awaiting repose. 0540 Bumex given. Cycling blood pressure.

## 2023-11-07 NOTE — CONSULTS
Name: [facility]: [address] [place] [address]   : 1970 Admit Date: 2023   Phone: [phone]  Room: ERD/D   PCP: No primary care provider on file. MRN: [id number]   Date: 2023  Code: Full Code          Chart and notes reviewed. Data reviewed. I review the patient's current medications in the medical record at each encounter. I have evaluated and examined the patient. HPI:    9:12 AM       History was obtained from patient. I was asked by Sakina Snow MD to see Yoselin Ellis in consultation for a chief complaint of acute respiratory failure with hypoxia. History of Present Illness:  Ms. Abdon Kim is a 47 yo woman with a history of emphsyema (seen on CT), HFpEF, CKD, and prior gastric bypass who is admitted with acute respiratory failure with hypoxia. She was hospitalized in September and was planned for out patient follow-up with PFTs, but did not do so. She comes in with shortness of breath, chest pain/pressure and abdominal pain. She has shortness of breath is both at rest and with exertion, but not consistent. Some chest tightness with deep breaths. No cough, wheezing. Found to be hypoxic, requiring up to 6L NC. 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 Her recent testing done during his last hospitalization returned MM phenotype with normal levels.      Labs: cr 1.47, proBNP 14,491, alpha-1 MM level 157, WBC 9.0, d-dimer 1.51    Images reviewed:  CXR 2023: lungs clear      Past Medical History:   Diagnosis Date    Anxiety and depression     CHF (congestive heart failure), NYHA class I, chronic, diastolic (HCC)     CKD (chronic kidney disease) stage 3, GFR 30-59 ml/min (HCC)     COPD (chronic obstructive pulmonary disease) (HCC)     Gastric bypass status for obesity        Past Surgical History:   Procedure Laterality Date    SHOULDER SURGERY         Family History   Problem Relation Age of Onset    Heart Disease Neg Hx further look into the diagnosis of A1AT mentioned in the chart as our records show something different and she was not aware that this had been diagnosed elsewhere      Thank you for allowing us to participate in the care of this patient. We will be happy to follow along in his/her progress with you.     Kj Crow MD

## 2023-11-08 PROBLEM — I27.20 PULMONARY HYPERTENSION (HCC): Status: ACTIVE | Noted: 2023-11-08

## 2023-11-08 PROBLEM — I27.81 COR PULMONALE (CHRONIC) (HCC): Status: ACTIVE | Noted: 2023-11-08

## 2023-11-08 LAB
ALBUMIN SERPL-MCNC: 3.2 G/DL (ref 3.5–5)
ALBUMIN/GLOB SERPL: 1 (ref 1.1–2.2)
ALP SERPL-CCNC: <10 U/L (ref 45–117)
ALT SERPL-CCNC: 30 U/L (ref 12–78)
ANION GAP SERPL CALC-SCNC: 8 MMOL/L (ref 5–15)
AST SERPL-CCNC: 29 U/L (ref 15–37)
BACTERIA SPEC CULT: NORMAL
BILIRUB SERPL-MCNC: 3 MG/DL (ref 0.2–1)
BUN SERPL-MCNC: 21 MG/DL (ref 6–20)
BUN/CREAT SERPL: 13 (ref 12–20)
CALCIUM SERPL-MCNC: 9.1 MG/DL (ref 8.5–10.1)
CHLORIDE SERPL-SCNC: 109 MMOL/L (ref 97–108)
CO2 SERPL-SCNC: 24 MMOL/L (ref 21–32)
CREAT SERPL-MCNC: 1.6 MG/DL (ref 0.55–1.02)
ERYTHROCYTE [DISTWIDTH] IN BLOOD BY AUTOMATED COUNT: 15.6 % (ref 11.5–14.5)
GLOBULIN SER CALC-MCNC: 3.3 G/DL (ref 2–4)
GLUCOSE SERPL-MCNC: 89 MG/DL (ref 65–100)
HCT VFR BLD AUTO: 39.2 % (ref 35–47)
HGB BLD-MCNC: 12.3 G/DL (ref 11.5–16)
MAGNESIUM SERPL-MCNC: 2.2 MG/DL (ref 1.6–2.4)
MCH RBC QN AUTO: 29.1 PG (ref 26–34)
MCHC RBC AUTO-ENTMCNC: 31.4 G/DL (ref 30–36.5)
MCV RBC AUTO: 92.7 FL (ref 80–99)
NRBC # BLD: 0 K/UL (ref 0–0.01)
NRBC BLD-RTO: 0 PER 100 WBC
PHOSPHATE SERPL-MCNC: 4.2 MG/DL (ref 2.6–4.7)
PLATELET # BLD AUTO: 167 K/UL (ref 150–400)
PMV BLD AUTO: 11.3 FL (ref 8.9–12.9)
POTASSIUM SERPL-SCNC: 3.7 MMOL/L (ref 3.5–5.1)
PROT SERPL-MCNC: 6.5 G/DL (ref 6.4–8.2)
RBC # BLD AUTO: 4.23 M/UL (ref 3.8–5.2)
SERVICE CMNT-IMP: NORMAL
SODIUM SERPL-SCNC: 141 MMOL/L (ref 136–145)
WBC # BLD AUTO: 8.3 K/UL (ref 3.6–11)

## 2023-11-08 PROCEDURE — 36415 COLL VENOUS BLD VENIPUNCTURE: CPT

## 2023-11-08 PROCEDURE — 85027 COMPLETE CBC AUTOMATED: CPT

## 2023-11-08 PROCEDURE — 83735 ASSAY OF MAGNESIUM: CPT

## 2023-11-08 PROCEDURE — 94640 AIRWAY INHALATION TREATMENT: CPT

## 2023-11-08 PROCEDURE — 6370000000 HC RX 637 (ALT 250 FOR IP): Performed by: INTERNAL MEDICINE

## 2023-11-08 PROCEDURE — 2700000000 HC OXYGEN THERAPY PER DAY

## 2023-11-08 PROCEDURE — C9113 INJ PANTOPRAZOLE SODIUM, VIA: HCPCS | Performed by: STUDENT IN AN ORGANIZED HEALTH CARE EDUCATION/TRAINING PROGRAM

## 2023-11-08 PROCEDURE — 2580000003 HC RX 258: Performed by: STUDENT IN AN ORGANIZED HEALTH CARE EDUCATION/TRAINING PROGRAM

## 2023-11-08 PROCEDURE — 6370000000 HC RX 637 (ALT 250 FOR IP): Performed by: STUDENT IN AN ORGANIZED HEALTH CARE EDUCATION/TRAINING PROGRAM

## 2023-11-08 PROCEDURE — 1100000000 HC RM PRIVATE

## 2023-11-08 PROCEDURE — APPSS30 APP SPLIT SHARED TIME 16-30 MINUTES: Performed by: NURSE PRACTITIONER

## 2023-11-08 PROCEDURE — A4216 STERILE WATER/SALINE, 10 ML: HCPCS | Performed by: STUDENT IN AN ORGANIZED HEALTH CARE EDUCATION/TRAINING PROGRAM

## 2023-11-08 PROCEDURE — 84100 ASSAY OF PHOSPHORUS: CPT

## 2023-11-08 PROCEDURE — 6360000002 HC RX W HCPCS: Performed by: INTERNAL MEDICINE

## 2023-11-08 PROCEDURE — 99232 SBSQ HOSP IP/OBS MODERATE 35: CPT | Performed by: INTERNAL MEDICINE

## 2023-11-08 PROCEDURE — 80053 COMPREHEN METABOLIC PANEL: CPT

## 2023-11-08 PROCEDURE — 6360000002 HC RX W HCPCS: Performed by: STUDENT IN AN ORGANIZED HEALTH CARE EDUCATION/TRAINING PROGRAM

## 2023-11-08 PROCEDURE — 2060000000 HC ICU INTERMEDIATE R&B

## 2023-11-08 PROCEDURE — 94761 N-INVAS EAR/PLS OXIMETRY MLT: CPT

## 2023-11-08 RX ADMIN — BUSPIRONE HYDROCHLORIDE 10 MG: 10 TABLET ORAL at 09:12

## 2023-11-08 RX ADMIN — SODIUM CHLORIDE, PRESERVATIVE FREE 10 ML: 5 INJECTION INTRAVENOUS at 20:59

## 2023-11-08 RX ADMIN — BUSPIRONE HYDROCHLORIDE 10 MG: 10 TABLET ORAL at 20:59

## 2023-11-08 RX ADMIN — ARFORMOTEROL TARTRATE: 15 SOLUTION RESPIRATORY (INHALATION) at 20:12

## 2023-11-08 RX ADMIN — SODIUM CHLORIDE, PRESERVATIVE FREE 40 MG: 5 INJECTION INTRAVENOUS at 09:14

## 2023-11-08 RX ADMIN — BUPROPION HYDROCHLORIDE 150 MG: 150 TABLET, EXTENDED RELEASE ORAL at 09:12

## 2023-11-08 RX ADMIN — SODIUM CHLORIDE, PRESERVATIVE FREE 10 ML: 5 INJECTION INTRAVENOUS at 09:09

## 2023-11-08 RX ADMIN — POTASSIUM BICARBONATE 20 MEQ: 782 TABLET, EFFERVESCENT ORAL at 09:13

## 2023-11-08 RX ADMIN — ARFORMOTEROL TARTRATE: 15 SOLUTION RESPIRATORY (INHALATION) at 07:24

## 2023-11-08 RX ADMIN — BUSPIRONE HYDROCHLORIDE 10 MG: 10 TABLET ORAL at 14:35

## 2023-11-08 RX ADMIN — FLUOXETINE 40 MG: 20 CAPSULE ORAL at 09:12

## 2023-11-08 RX ADMIN — SODIUM CHLORIDE: 9 INJECTION, SOLUTION INTRAVENOUS at 16:01

## 2023-11-08 RX ADMIN — SODIUM CHLORIDE: 9 INJECTION, SOLUTION INTRAVENOUS at 06:37

## 2023-11-08 NOTE — PLAN OF CARE
Problem: Respiratory - Adult  Goal: Achieves optimal ventilation and oxygenation  Outcome: Progressing     Problem: Cardiovascular - Adult  Goal: Maintains optimal cardiac output and hemodynamic stability  Outcome: Progressing  Goal: Absence of cardiac dysrhythmias or at baseline  Outcome: Progressing     Problem: Skin/Tissue Integrity - Adult  Goal: Skin integrity remains intact  Outcome: Progressing     Problem: Metabolic/Fluid and Electrolytes - Adult  Goal: Electrolytes maintained within normal limits  Outcome: Progressing

## 2023-11-08 NOTE — PROGRESS NOTES
[address] [facility]                    Cardiology Care Note     []Initial Encounter     [x]Follow-up    Patient Name: Zohreh Ortiz XOP:[id number]  Primary Cardiologist: Susannah Edwards MD  Consulting Cardiologist: Tati Moore MD     Reason for encounter:    HPI:   Shlomo Lockett is a 48 y.o. female with PMH significant for HFpEF, COPD, Obesity s/p gastric bypass presenting with chest pain, non radiating, anterior chest wall and abdominal wall, associated with SOB. Moderate intensity pain. Trop eval was normal.     EKG non ischemic. Most recent HS troponins:  Recent Labs     11/06/23  1045 11/06/23  2123   TROPHS 36 48        Assessment and Plan     Chest pain, pericardial effusion (unclear etiology): TTE w/ now large pericardial effusion, had mod in Sept but viral panel neg at that time (did not follow up with our office), wanted to rule out amyloid/sarcoidosis in OP setting as well. No sign of tamponade. No PE on chest CTA. Hold off on stress test for now as effusion likely cause of her symptoms, further testing TBD   HFpEF, RV dysfunction, mod to severe pulm HTN: Bumex per BP/Cr tolerance. Worsening RV function recent echo from prior w/ severely dilated RV, no PE on chest CTA. ? RHC  Obesity  Emphysema: found on prior chest CT, pulmonary following  MARILIA on CKD: Cr 1.60  +Rhinovirus        ____________________________________________________________    Cardiac testing  11/06/23    ECHO (TTE) LIMITED (PRN CONTRAST/BUBBLE/STRAIN/3D) 11/07/2023  3:50 PM (Final)    Interpretation Summary    Left Ventricle: Low normal left ventricular systolic function with a visually estimated EF of 50 - 55%. Left ventricle size is normal. Normal wall thickness. Septal flattening in diastole and systole consistent with right ventricular volume and pressure overload. Right Ventricle: Right ventricle is severely dilated. Severely reduced systolic function. Tricuspid Valve:  Moderate

## 2023-11-08 NOTE — PROGRESS NOTES
0700 Bedside and Verbal shift change report given to [employer] (oncoming nurse) by Gifty Verma RN (offgoing nurse). Report included the following information Nurse Handoff Report, Adult Overview, Intake/Output, MAR, and Recent Results. 8444 Patient blood pressure 99/71. Per Raimundo Lara MD, OK to hold PO Bumex this morning. 1900 Bedside and Verbal shift change report given to [facility] (oncoming nurse) by Reji Hansen RN (offgoing nurse). Report included the following information Nurse Handoff Report, Adult Overview, Intake/Output, MAR, and Recent Results.

## 2023-11-08 NOTE — PROGRESS NOTES
Name: Psychiatric: [facility]   : 1970 Admit Date: 2023   Phone: [phone]  Room: [facility]/01   PCP: No primary care provider on file. MRN: [id number]   Date: 2023  Code: Full Code          Chart and notes reviewed. Data reviewed. I review the patient's current medications in the medical record at each encounter. I have evaluated and examined the patient. History of Present Illness:  Ms. Karie Phillips is a 49 yo woman with a history of emphsyema (seen on CT), HFpEF, CKD, and prior gastric bypass who is admitted with acute respiratory failure with hypoxia. She was hospitalized in September and was planned for out patient follow-up with PFTs, but did not do so. She comes in with shortness of breath, chest pain/pressure and abdominal pain. She has shortness of breath is both at rest and with exertion, but not consistent. Some chest tightness with deep breaths. No cough, wheezing. Found to be hypoxic, requiring up to 6L NC. 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 Her recent testing done during his last hospitalization returned MM phenotype with normal levels. Labs: cr 1.47, proBNP 14,491, alpha-1 MM level 157, WBC 9.0, d-dimer 1.51    Images reviewed:  CXR 2023: lungs clear    Interval history  Afebrile  BP soft/stable  Sats 94% on 3L  Creat 1.60 - stable  RVP with rhinovirus     chest CTA personally reviewed. No evidence of pulmonary embolism. Chronic cardiomegaly with moderate pericardial effusion, not significantly changed. Chronic increased attenuation of both lungs with prominent interstitial markings may represent mild pulmonary edema or atypical/viral pneumonia. 117 ECHO: EF 50-55%; RV severely dilated; severely reduced RVSF; mod to severe pulm HTN    ROS: Reports coughing up a large amount of thick green mucus and her breathing is now improved. Still with intermittent cough. Denies CP. Denies LE pain/swelling. Denies fever.       Past Medical History:   Diagnosis Date    Anxiety and depression     CHF (congestive heart failure), NYHA class I, chronic, diastolic ([facility])     CKD (chronic kidney disease) stage 3, GFR 30-59 ml/min ([facility])     COPD (chronic obstructive pulmonary disease) ([facility])     Cor pulmonale (chronic) ([facility])     Gastric bypass status for obesity     Pulmonary hypertension ([facility])        Past Surgical History:   Procedure Laterality Date    SHOULDER SURGERY         Family History   Problem Relation Age of Onset    Heart Disease Neg Hx        Social History     Tobacco Use    Smoking status: Never    Smokeless tobacco: Never   Substance Use Topics    Alcohol use: Yes     Comment: socially       Allergies   Allergen Reactions    Ceftriaxone Itching    Ciprofloxacin Itching    Sulfa Antibiotics Itching       Current Facility-Administered Medications   Medication Dose Route Frequency    FLUoxetine (PROZAC) capsule 40 mg  40 mg Oral Daily    bumetanide (BUMEX) tablet 1 mg  1 mg Oral Daily    ipratropium 0.5 mg-albuterol 2.5 mg (DUONEB) nebulizer solution 1 Dose  1 Dose Inhalation Q6H PRN    potassium bicarb-citric acid (EFFER-K) effervescent tablet 20 mEq  20 mEq Oral Daily    arformoterol 15 mcg-budesonide 0.5 mg neb solution   Nebulization BID RT    sodium chloride flush 0.9 % injection 5-40 mL  5-40 mL IntraVENous 2 times per day    sodium chloride flush 0.9 % injection 5-40 mL  5-40 mL IntraVENous PRN    0.9 % sodium chloride infusion   IntraVENous PRN    ondansetron (ZOFRAN-ODT) disintegrating tablet 4 mg  4 mg Oral Q8H PRN    Or    ondansetron (ZOFRAN) injection 4 mg  4 mg IntraVENous Q6H PRN    polyethylene glycol (GLYCOLAX) packet 17 g  17 g Oral Daily PRN    acetaminophen (TYLENOL) tablet 650 mg  650 mg Oral Q6H PRN    Or    acetaminophen (TYLENOL) suppository 650 mg  650 mg Rectal Q6H PRN    HYDROmorphone HCl PF (DILAUDID) injection 1 mg  1 mg IntraVENous Q4H PRN    oxyCODONE (ROXICODONE) immediate release

## 2023-11-08 NOTE — CARE COORDINATION
11/08/23 1414   Service Assessment   Patient Orientation Alert and Oriented   Cognition Alert   History Provided By Patient   Primary [address] is: Legal Next of Kin   PCP Verified by CM   (Uses [facility] on [address]. Has a new PCP appointment with Sapna Snider in April 2024. 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 She is independent with ADLs and ambulation. Patient drives and works full-time. CM to follow for home O2 needs.

## 2023-11-09 LAB
ANION GAP SERPL CALC-SCNC: 8 MMOL/L (ref 5–15)
BUN SERPL-MCNC: 18 MG/DL (ref 6–20)
BUN/CREAT SERPL: 11 (ref 12–20)
CALCIUM SERPL-MCNC: 8.6 MG/DL (ref 8.5–10.1)
CHLORIDE SERPL-SCNC: 112 MMOL/L (ref 97–108)
CO2 SERPL-SCNC: 24 MMOL/L (ref 21–32)
CREAT SERPL-MCNC: 1.59 MG/DL (ref 0.55–1.02)
GLUCOSE SERPL-MCNC: 93 MG/DL (ref 65–100)
POTASSIUM SERPL-SCNC: 4 MMOL/L (ref 3.5–5.1)
SODIUM SERPL-SCNC: 144 MMOL/L (ref 136–145)

## 2023-11-09 PROCEDURE — 94761 N-INVAS EAR/PLS OXIMETRY MLT: CPT

## 2023-11-09 PROCEDURE — 2580000003 HC RX 258: Performed by: STUDENT IN AN ORGANIZED HEALTH CARE EDUCATION/TRAINING PROGRAM

## 2023-11-09 PROCEDURE — 6360000002 HC RX W HCPCS: Performed by: INTERNAL MEDICINE

## 2023-11-09 PROCEDURE — 6370000000 HC RX 637 (ALT 250 FOR IP): Performed by: STUDENT IN AN ORGANIZED HEALTH CARE EDUCATION/TRAINING PROGRAM

## 2023-11-09 PROCEDURE — 97161 PT EVAL LOW COMPLEX 20 MIN: CPT

## 2023-11-09 PROCEDURE — 6370000000 HC RX 637 (ALT 250 FOR IP): Performed by: INTERNAL MEDICINE

## 2023-11-09 PROCEDURE — 80048 BASIC METABOLIC PNL TOTAL CA: CPT

## 2023-11-09 PROCEDURE — C9113 INJ PANTOPRAZOLE SODIUM, VIA: HCPCS | Performed by: STUDENT IN AN ORGANIZED HEALTH CARE EDUCATION/TRAINING PROGRAM

## 2023-11-09 PROCEDURE — 6360000002 HC RX W HCPCS: Performed by: STUDENT IN AN ORGANIZED HEALTH CARE EDUCATION/TRAINING PROGRAM

## 2023-11-09 PROCEDURE — 99232 SBSQ HOSP IP/OBS MODERATE 35: CPT | Performed by: INTERNAL MEDICINE

## 2023-11-09 PROCEDURE — 2700000000 HC OXYGEN THERAPY PER DAY

## 2023-11-09 PROCEDURE — 36415 COLL VENOUS BLD VENIPUNCTURE: CPT

## 2023-11-09 PROCEDURE — 97110 THERAPEUTIC EXERCISES: CPT

## 2023-11-09 PROCEDURE — 2060000000 HC ICU INTERMEDIATE R&B

## 2023-11-09 PROCEDURE — 94640 AIRWAY INHALATION TREATMENT: CPT

## 2023-11-09 RX ORDER — PANTOPRAZOLE SODIUM 40 MG/1
40 TABLET, DELAYED RELEASE ORAL
Status: DISCONTINUED | OUTPATIENT
Start: 2023-11-10 | End: 2023-11-14 | Stop reason: HOSPADM

## 2023-11-09 RX ADMIN — BUSPIRONE HYDROCHLORIDE 10 MG: 10 TABLET ORAL at 19:56

## 2023-11-09 RX ADMIN — SODIUM CHLORIDE: 9 INJECTION, SOLUTION INTRAVENOUS at 04:30

## 2023-11-09 RX ADMIN — ARFORMOTEROL TARTRATE: 15 SOLUTION RESPIRATORY (INHALATION) at 07:22

## 2023-11-09 RX ADMIN — BUSPIRONE HYDROCHLORIDE 10 MG: 10 TABLET ORAL at 08:37

## 2023-11-09 RX ADMIN — SODIUM CHLORIDE, PRESERVATIVE FREE 40 MG: 5 INJECTION INTRAVENOUS at 08:38

## 2023-11-09 RX ADMIN — BUPROPION HYDROCHLORIDE 150 MG: 150 TABLET, EXTENDED RELEASE ORAL at 08:37

## 2023-11-09 RX ADMIN — BUMETANIDE 1 MG: 1 TABLET ORAL at 08:37

## 2023-11-09 RX ADMIN — POTASSIUM BICARBONATE 20 MEQ: 782 TABLET, EFFERVESCENT ORAL at 08:37

## 2023-11-09 RX ADMIN — SODIUM CHLORIDE, PRESERVATIVE FREE 10 ML: 5 INJECTION INTRAVENOUS at 19:55

## 2023-11-09 RX ADMIN — BUSPIRONE HYDROCHLORIDE 10 MG: 10 TABLET ORAL at 15:36

## 2023-11-09 RX ADMIN — FLUOXETINE 40 MG: 20 CAPSULE ORAL at 08:38

## 2023-11-09 NOTE — PROGRESS NOTES
Name: [facility]: [place]   : 1970 Admit Date: 2023   Phone: [phone]  Room: [facility]/01   PCP: No primary care provider on file. MRN: [id number]   Date: 2023  Code: Full Code          Chart and notes reviewed. Data reviewed. I review the patient's current medications in the medical record at each encounter. I have evaluated and examined the patient. History of Present Illness:  Ms. Mookie Lara is a 47 yo woman with a history of emphsyema (seen on CT), HFpEF, CKD, and prior gastric bypass who is admitted with acute respiratory failure with hypoxia. She was hospitalized in September and was planned for out patient follow-up with PFTs, but did not do so. She comes in with shortness of breath, chest pain/pressure and abdominal pain. She has shortness of breath is both at rest and with exertion, but not consistent. Some chest tightness with deep breaths. No cough, wheezing. Found to be hypoxic, requiring up to 6L NC. 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 Her recent testing done during his last hospitalization returned MM phenotype with normal levels. Labs: cr 1.47, proBNP 14,491, alpha-1 MM level 157, WBC 9.0, d-dimer 1.51    Images reviewed:  CXR 2023: lungs clear    Interval history  Afebrile  BP soft/stable  Sats 91% on 2L  350ml UOP documented  Creat 1.60 - stable  RVP with rhinovirus  Urine cx no growth      chest CTA personally reviewed. No evidence of pulmonary embolism. Chronic cardiomegaly with moderate pericardial effusion, not significantly changed. Chronic increased attenuation of both lungs with prominent interstitial markings may represent mild pulmonary edema or atypical/viral pneumonia. 117 ECHO: EF 50-55%; RV severely dilated; severely reduced RVSF; mod to severe pulm HTN; large pericardial effusion (> 2cm) - no tamponade    ROS: Reports continued MCDOWELL - even just ambulating to the bathroom.   Has been coughing a lot, but not as productive today. Denies CP. Denies LE pain/swelling. Denies fever.       Past Medical History:   Diagnosis Date    Anxiety and depression     CHF (congestive heart failure), NYHA class I, chronic, diastolic (HCC)     CKD (chronic kidney disease) stage 3, GFR 30-59 ml/min ([facility])     COPD (chronic obstructive pulmonary disease) (HCC)     Cor pulmonale (chronic) ([facility])     Gastric bypass status for obesity     Pulmonary hypertension (HCC)        Past Surgical History:   Procedure Laterality Date    SHOULDER SURGERY         Family History   Problem Relation Age of Onset    Heart Disease Neg Hx        Social History     Tobacco Use    Smoking status: Never    Smokeless tobacco: Never   Substance Use Topics    Alcohol use: Yes     Comment: socially       Allergies   Allergen Reactions    Ceftriaxone Itching    Ciprofloxacin Itching    Sulfa Antibiotics Itching       Current Facility-Administered Medications   Medication Dose Route Frequency    FLUoxetine (PROZAC) capsule 40 mg  40 mg Oral Daily    bumetanide (BUMEX) tablet 1 mg  1 mg Oral Daily    ipratropium 0.5 mg-albuterol 2.5 mg (DUONEB) nebulizer solution 1 Dose  1 Dose Inhalation Q6H PRN    potassium bicarb-citric acid (EFFER-K) effervescent tablet 20 mEq  20 mEq Oral Daily    arformoterol 15 mcg-budesonide 0.5 mg neb solution   Nebulization BID RT    sodium chloride flush 0.9 % injection 5-40 mL  5-40 mL IntraVENous 2 times per day    sodium chloride flush 0.9 % injection 5-40 mL  5-40 mL IntraVENous PRN    0.9 % sodium chloride infusion   IntraVENous PRN    ondansetron (ZOFRAN-ODT) disintegrating tablet 4 mg  4 mg Oral Q8H PRN    Or    ondansetron (ZOFRAN) injection 4 mg  4 mg IntraVENous Q6H PRN    polyethylene glycol (GLYCOLAX) packet 17 g  17 g Oral Daily PRN    acetaminophen (TYLENOL) tablet 650 mg  650 mg Oral Q6H PRN    Or    acetaminophen (TYLENOL) suppository 650 mg  650 mg Rectal Q6H PRN    HYDROmorphone HCl PF (DILAUDID)

## 2023-11-09 NOTE — PROGRESS NOTES
tablet 1 mg, 1 mg, Oral, Daily, Pb Sanchez MD, 1 mg at 11/09/23 0837    ipratropium 0.5 mg-albuterol 2.5 mg (DUONEB) nebulizer solution 1 Dose, 1 Dose, Inhalation, Q6H PRN, Pb Sanchez MD    potassium bicarb-citric acid (EFFER-K) effervescent tablet 20 mEq, 20 mEq, Oral, Daily, Pb Sanchez MD, 20 mEq at 11/09/23 0837    arformoterol 15 mcg-budesonide 0.5 mg neb solution, , Nebulization, BID RT, Pb Sanchez MD, Given at 11/09/23 6241    sodium chloride flush 0.9 % injection 5-40 mL, 5-40 mL, IntraVENous, 2 times per day, Darlis Fothergill, MD, 10 mL at 11/08/23 2059    sodium chloride flush 0.9 % injection 5-40 mL, 5-40 mL, IntraVENous, PRN, Ochoa Lake MD    0.9 % sodium chloride infusion, , IntraVENous, PRN, Ochoa Lake MD    ondansetron (ZOFRAN-ODT) disintegrating tablet 4 mg, 4 mg, Oral, Q8H PRN **OR** ondansetron (ZOFRAN) injection 4 mg, 4 mg, IntraVENous, Q6H PRN, Ochoa Lake MD    polyethylene glycol (GLYCOLAX) packet 17 g, 17 g, Oral, Daily PRN, Juan Ochoa Khan MD    acetaminophen (TYLENOL) tablet 650 mg, 650 mg, Oral, Q6H PRN **OR** acetaminophen (TYLENOL) suppository 650 mg, 650 mg, Rectal, Q6H PRN, Ochoa Lake MD    HYDROmorphone HCl PF (DILAUDID) injection 1 mg, 1 mg, IntraVENous, Q4H PRN, Ochoa Lake MD    oxyCODONE (ROXICODONE) immediate release tablet 5 mg, 5 mg, Oral, Q4H PRN, Ochoa Lake MD    busPIRone (BUSPAR) tablet 10 mg, 10 mg, Oral, TID, Ochoa Lake MD, 10 mg at 11/09/23 0837    pantoprazole (PROTONIX) 40 mg in sodium chloride (PF) 0.9 % 10 mL injection, 40 mg, IntraVENous, Daily, Ochoa Lake MD, 40 mg at 11/09/23 0838    buPROPion (WELLBUTRIN XL) extended release tablet 150 mg, 150 mg, Oral, Daily, Ochoa Lake MD, 150 mg at 11/09/23 0837    0.9 % sodium chloride infusion, , IntraVENous, Continuous, Ochoa Lake MD, Last Rate: 100 mL/hr at 11/09/23 0430, New Bag at 11/09/23 [address] [facility], [address] [place] [address] Cardiology  Call center: [phone]      CC: No primary care provider on file.

## 2023-11-09 NOTE — PROGRESS NOTES
Pharmacy Dosing Services: 11/09/23    The pharmacist has determined that this patient meets P & T approved criteria for conversion from IV to oral therapy for the following medication:Protonix 40 mg IV daily      The pharmacist has written the following order for the patient: Protonix 40 mg po daily    The pharmacist will continue to monitor the patient's status and advise the physician if conversion back to IV therapy is recommended.     Daphnie, [address] [place] [address]   [phone]

## 2023-11-09 NOTE — CARE COORDINATION
CM Note:  PT following-pt will need home O2  No PT hh needed  Pt can go to OP tx as she will not qualify for IPR at [facility] She had no preference for a DME company--a referral was sent in [address] to [address] to transport herself home at d/c  Peterson Madrigal

## 2023-11-10 LAB
ANION GAP SERPL CALC-SCNC: 10 MMOL/L (ref 5–15)
BUN SERPL-MCNC: 19 MG/DL (ref 6–20)
BUN/CREAT SERPL: 12 (ref 12–20)
CALCIUM SERPL-MCNC: 8.7 MG/DL (ref 8.5–10.1)
CHLORIDE SERPL-SCNC: 111 MMOL/L (ref 97–108)
CO2 SERPL-SCNC: 20 MMOL/L (ref 21–32)
CREAT SERPL-MCNC: 1.64 MG/DL (ref 0.55–1.02)
GLUCOSE SERPL-MCNC: 98 MG/DL (ref 65–100)
POTASSIUM SERPL-SCNC: 3.8 MMOL/L (ref 3.5–5.1)
SODIUM SERPL-SCNC: 141 MMOL/L (ref 136–145)

## 2023-11-10 PROCEDURE — 94640 AIRWAY INHALATION TREATMENT: CPT

## 2023-11-10 PROCEDURE — 2700000000 HC OXYGEN THERAPY PER DAY

## 2023-11-10 PROCEDURE — 2060000000 HC ICU INTERMEDIATE R&B

## 2023-11-10 PROCEDURE — 99232 SBSQ HOSP IP/OBS MODERATE 35: CPT | Performed by: INTERNAL MEDICINE

## 2023-11-10 PROCEDURE — 94761 N-INVAS EAR/PLS OXIMETRY MLT: CPT

## 2023-11-10 PROCEDURE — 6370000000 HC RX 637 (ALT 250 FOR IP): Performed by: STUDENT IN AN ORGANIZED HEALTH CARE EDUCATION/TRAINING PROGRAM

## 2023-11-10 PROCEDURE — 6360000002 HC RX W HCPCS: Performed by: INTERNAL MEDICINE

## 2023-11-10 PROCEDURE — 2580000003 HC RX 258: Performed by: STUDENT IN AN ORGANIZED HEALTH CARE EDUCATION/TRAINING PROGRAM

## 2023-11-10 PROCEDURE — 94667 MNPJ CHEST WALL 1ST: CPT

## 2023-11-10 PROCEDURE — 6370000000 HC RX 637 (ALT 250 FOR IP): Performed by: INTERNAL MEDICINE

## 2023-11-10 PROCEDURE — 36415 COLL VENOUS BLD VENIPUNCTURE: CPT

## 2023-11-10 PROCEDURE — APPSS30 APP SPLIT SHARED TIME 16-30 MINUTES: Performed by: NURSE PRACTITIONER

## 2023-11-10 PROCEDURE — 80048 BASIC METABOLIC PNL TOTAL CA: CPT

## 2023-11-10 RX ADMIN — FLUOXETINE 40 MG: 20 CAPSULE ORAL at 08:58

## 2023-11-10 RX ADMIN — BUSPIRONE HYDROCHLORIDE 10 MG: 10 TABLET ORAL at 08:59

## 2023-11-10 RX ADMIN — BUSPIRONE HYDROCHLORIDE 10 MG: 10 TABLET ORAL at 13:59

## 2023-11-10 RX ADMIN — SODIUM CHLORIDE, PRESERVATIVE FREE 10 ML: 5 INJECTION INTRAVENOUS at 21:01

## 2023-11-10 RX ADMIN — PANTOPRAZOLE SODIUM 40 MG: 40 TABLET, DELAYED RELEASE ORAL at 06:03

## 2023-11-10 RX ADMIN — BUMETANIDE 1 MG: 1 TABLET ORAL at 08:57

## 2023-11-10 RX ADMIN — ARFORMOTEROL TARTRATE: 15 SOLUTION RESPIRATORY (INHALATION) at 19:34

## 2023-11-10 RX ADMIN — BUPROPION HYDROCHLORIDE 150 MG: 150 TABLET, EXTENDED RELEASE ORAL at 08:58

## 2023-11-10 RX ADMIN — SODIUM CHLORIDE, PRESERVATIVE FREE 10 ML: 5 INJECTION INTRAVENOUS at 08:59

## 2023-11-10 RX ADMIN — ARFORMOTEROL TARTRATE: 15 SOLUTION RESPIRATORY (INHALATION) at 07:11

## 2023-11-10 RX ADMIN — BUSPIRONE HYDROCHLORIDE 10 MG: 10 TABLET ORAL at 21:00

## 2023-11-10 RX ADMIN — POTASSIUM BICARBONATE 20 MEQ: 782 TABLET, EFFERVESCENT ORAL at 08:58

## 2023-11-10 NOTE — PROGRESS NOTES
Name: [facility]: [facility]   : 1970 Admit Date: 2023   Phone: [phone]  Room: [facility]/01   PCP: No primary care provider on file. MRN: [id number]   Date: 11/10/2023  Code: Full Code          Chart and notes reviewed. Data reviewed. I review the patient's current medications in the medical record at each encounter. I have evaluated and examined the patient. History of Present Illness:  Ms. Sung Martinez is a 49 yo woman with a history of emphsyema (seen on CT), HFpEF, CKD, and prior gastric bypass who is admitted with acute respiratory failure with hypoxia. She was hospitalized in September and was planned for out patient follow-up with PFTs, but did not do so. She comes in with shortness of breath, chest pain/pressure and abdominal pain. She has shortness of breath is both at rest and with exertion, but not consistent. Some chest tightness with deep breaths. No cough, wheezing. Found to be hypoxic, requiring up to 6L NC. 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 Her recent testing done during his last hospitalization returned MM phenotype with normal levels. Labs: cr 1.47, proBNP 14,491, alpha-1 MM level 157, WBC 9.0, d-dimer 1.51    Images reviewed:  CXR 2023: lungs clear    Interval history  Afebrile  BP soft/stable  Sats 86-93% on 2L; required 2L with PT assessment  1000ml UOP documented  Creat 1.64 - stable  RVP with rhinovirus  Urine cx no growth      chest CTA personally reviewed. No evidence of pulmonary embolism. Chronic cardiomegaly with moderate pericardial effusion, not significantly changed. Chronic increased attenuation of both lungs with prominent interstitial markings may represent mild pulmonary edema or atypical/viral pneumonia. 117 ECHO: EF 50-55%; RV severely dilated; severely reduced RVSF; mod to severe pulm HTN; large pericardial effusion (> 2cm) - no tamponade    ROS: Still with some MCDOWELL.

## 2023-11-10 NOTE — PROGRESS NOTES
[address] [facility]                    Cardiology Care Note     []Initial Encounter     [x]Follow-up    Patient Name: Dakota Rogers - OEL:[id number]  Primary Cardiologist: Ryan Moise MD  Consulting Cardiologist: Zach Buchanan MD     Reason for encounter:    HPI:   Maame Soto is a 48 y.o. female with PMH significant for HFpEF, COPD, Obesity s/p gastric bypass presenting with chest pain, non radiating, anterior chest wall and abdominal wall, associated with SOB. Moderate intensity pain. Trop eval was normal.     EKG non ischemic. Feels well today without pain. Continues to have MCDOWELL ambulating to BR. Most recent HS troponins:  No results for input(s): \"TROPHS\", \"CKMB\" in the last 72 hours. Assessment and Plan     Chest pain, MCDOWELL, pericardial effusion (unclear etiology): TTE w/ now large pericardial effusion, had mod in Sept but viral panel neg at that time (did not follow up with our office), wanted to rule out amyloid/sarcoidosis in OP setting as well. No sign of tamponade. No PE on chest CTA. Hold off on stress test for now as effusion likely cause of her symptoms, further testing TBD. Repeat limited Echo on Monday    HFpEF, RV dysfunction, mod to severe pulm HTN likely due to COPD: Bumex per BP/Cr tolerance. Worsening RV function recent echo from prior w/ severely dilated RV, no PE on chest CTA    Obesity    Emphysema: found on prior chest CT, now on 2L NC, pulmonary following    MARILIA on CKD: Cr 1.64    +Rhinovirus     Pt is high risk to decompensate/readmission, would recommend to keep through the weekend and repeat echo on Monday.  Cont diuretics as able (some hypotension overnight), will see prn through the weekend       ____________________________________________________________    Cardiac testing  11/06/23    ECHO (TTE) LIMITED (PRN CONTRAST/BUBBLE/STRAIN/3D) 11/07/2023  3:50 PM (Final)    Interpretation Summary    Left Ventricle: Low normal left ventricular administration of  nonionic contrast 100 mL of isovue 370 according to departmental PE protocol. Coronal and sagittal reformats were performed. 3D post processing was performed. CT dose reduction was achieved through the use of a standardized protocol  tailored for this examination and automatic exposure control for dose  modulation. FINDINGS: This is a good quality study for the evaluation of pulmonary embolism  to the first subsegmental arterial level. There is no pulmonary embolism to this  level. THYROID: No nodule. MEDIASTINUM: Numerous small mediastinal lymph nodes, none of which are  pathologically enlarged  AMBERLY: No mass or lymphadenopathy. THORACIC AORTA: No aneurysm. HEART: Cardiomegaly with chronic moderate pericardial effusion, not  significantly changed  ESOPHAGUS: Tiny hiatal hernia  TRACHEA/BRONCHI: Patent. PLEURA: No effusion or pneumothorax. LUNGS: Pulmonary edema pattern, similar to prior study  UPPER ABDOMEN: Partially imaged. No acute pathology. BONES: No aggressive bone lesion or fracture. Impression  1. No evidence of pulmonary embolism. 2. Chronic cardiomegaly with moderate pericardial effusion, not significantly  changed. 3. Chronic increased attenuation of both lungs with prominent interstitial  markings may represent mild pulmonary edema or atypical/viral pneumonia. Lab Results   Component Value Date/Time     11/10/2023 12:07 AM    K 3.8 11/10/2023 12:07 AM     11/10/2023 12:07 AM    CO2 20 11/10/2023 12:07 AM    BUN 19 11/10/2023 12:07 AM    CREATININE 1.64 11/10/2023 12:07 AM    GLUCOSE 98 11/10/2023 12:07 AM    CALCIUM 8.7 11/10/2023 12:07 AM    LABGLOM 38 11/10/2023 12:07 AM      No results found for: \"BNP\"  Lab Results   Component Value Date    WBC 8.3 11/08/2023    HGB 12.3 11/08/2023    HCT 39.2 11/08/2023    MCV 92.7 11/08/2023     11/08/2023     No results for input(s): \"CHOL\", \"HDLC\", \"LDLC\", \"HBA1C\" in the last 72 hours.     Invalid

## 2023-11-11 LAB
ANION GAP SERPL CALC-SCNC: 9 MMOL/L (ref 5–15)
BUN SERPL-MCNC: 18 MG/DL (ref 6–20)
BUN/CREAT SERPL: 11 (ref 12–20)
CALCIUM SERPL-MCNC: 9.1 MG/DL (ref 8.5–10.1)
CHLORIDE SERPL-SCNC: 110 MMOL/L (ref 97–108)
CO2 SERPL-SCNC: 21 MMOL/L (ref 21–32)
CREAT SERPL-MCNC: 1.58 MG/DL (ref 0.55–1.02)
GLUCOSE SERPL-MCNC: 92 MG/DL (ref 65–100)
POTASSIUM SERPL-SCNC: 4 MMOL/L (ref 3.5–5.1)
SODIUM SERPL-SCNC: 140 MMOL/L (ref 136–145)

## 2023-11-11 PROCEDURE — 6360000002 HC RX W HCPCS: Performed by: INTERNAL MEDICINE

## 2023-11-11 PROCEDURE — 80048 BASIC METABOLIC PNL TOTAL CA: CPT

## 2023-11-11 PROCEDURE — 2060000000 HC ICU INTERMEDIATE R&B

## 2023-11-11 PROCEDURE — 36415 COLL VENOUS BLD VENIPUNCTURE: CPT

## 2023-11-11 PROCEDURE — 6370000000 HC RX 637 (ALT 250 FOR IP): Performed by: STUDENT IN AN ORGANIZED HEALTH CARE EDUCATION/TRAINING PROGRAM

## 2023-11-11 PROCEDURE — 94668 MNPJ CHEST WALL SBSQ: CPT

## 2023-11-11 PROCEDURE — 94761 N-INVAS EAR/PLS OXIMETRY MLT: CPT

## 2023-11-11 PROCEDURE — 2700000000 HC OXYGEN THERAPY PER DAY

## 2023-11-11 PROCEDURE — 6370000000 HC RX 637 (ALT 250 FOR IP): Performed by: INTERNAL MEDICINE

## 2023-11-11 PROCEDURE — 94640 AIRWAY INHALATION TREATMENT: CPT

## 2023-11-11 PROCEDURE — 2580000003 HC RX 258: Performed by: STUDENT IN AN ORGANIZED HEALTH CARE EDUCATION/TRAINING PROGRAM

## 2023-11-11 RX ADMIN — BUMETANIDE 1 MG: 1 TABLET ORAL at 08:57

## 2023-11-11 RX ADMIN — BUPROPION HYDROCHLORIDE 150 MG: 150 TABLET, EXTENDED RELEASE ORAL at 08:57

## 2023-11-11 RX ADMIN — BUSPIRONE HYDROCHLORIDE 10 MG: 10 TABLET ORAL at 20:59

## 2023-11-11 RX ADMIN — POTASSIUM BICARBONATE 20 MEQ: 782 TABLET, EFFERVESCENT ORAL at 08:57

## 2023-11-11 RX ADMIN — ARFORMOTEROL TARTRATE: 15 SOLUTION RESPIRATORY (INHALATION) at 20:44

## 2023-11-11 RX ADMIN — PANTOPRAZOLE SODIUM 40 MG: 40 TABLET, DELAYED RELEASE ORAL at 06:13

## 2023-11-11 RX ADMIN — SODIUM CHLORIDE, PRESERVATIVE FREE 10 ML: 5 INJECTION INTRAVENOUS at 08:57

## 2023-11-11 RX ADMIN — SODIUM CHLORIDE, PRESERVATIVE FREE 10 ML: 5 INJECTION INTRAVENOUS at 21:00

## 2023-11-11 RX ADMIN — ARFORMOTEROL TARTRATE: 15 SOLUTION RESPIRATORY (INHALATION) at 07:39

## 2023-11-11 RX ADMIN — BUSPIRONE HYDROCHLORIDE 10 MG: 10 TABLET ORAL at 08:56

## 2023-11-11 RX ADMIN — FLUOXETINE 40 MG: 20 CAPSULE ORAL at 08:57

## 2023-11-11 RX ADMIN — BUSPIRONE HYDROCHLORIDE 10 MG: 10 TABLET ORAL at 15:25

## 2023-11-11 NOTE — PROGRESS NOTES
1900 Bedside shift change report given to Rosario Gutiérrez (oncoming nurse) by Wang Bello (offgoing nurse). Report included the following information Nurse Handoff Report, Adult Overview, Intake/Output, MAR, Recent Results, Med Rec Status, and Cardiac Rhythm NSR .     0700 Bedside shift change report given to Art Garza (oncoming nurse) by Rosario Gutiérrez (offgoing nurse). Report included the following information Nurse Handoff Report, Adult Overview, Intake/Output, MAR, Recent Results, Med Rec Status, and Cardiac Rhythm NSR .

## 2023-11-11 NOTE — PLAN OF CARE
Problem: Skin/Tissue Integrity - Adult  Goal: Skin integrity remains intact  Recent Flowsheet Documentation  Taken 11/11/2023 0809 by Simi Disla RN  Skin Integrity Remains Intact: Monitor for areas of redness and/or skin breakdown

## 2023-11-12 LAB
ANION GAP SERPL CALC-SCNC: 9 MMOL/L (ref 5–15)
BUN SERPL-MCNC: 18 MG/DL (ref 6–20)
BUN/CREAT SERPL: 11 (ref 12–20)
CALCIUM SERPL-MCNC: 9 MG/DL (ref 8.5–10.1)
CHLORIDE SERPL-SCNC: 108 MMOL/L (ref 97–108)
CO2 SERPL-SCNC: 22 MMOL/L (ref 21–32)
CREAT SERPL-MCNC: 1.6 MG/DL (ref 0.55–1.02)
GLUCOSE SERPL-MCNC: 102 MG/DL (ref 65–100)
POTASSIUM SERPL-SCNC: 3.7 MMOL/L (ref 3.5–5.1)
SODIUM SERPL-SCNC: 139 MMOL/L (ref 136–145)

## 2023-11-12 PROCEDURE — 94668 MNPJ CHEST WALL SBSQ: CPT

## 2023-11-12 PROCEDURE — 6360000002 HC RX W HCPCS: Performed by: INTERNAL MEDICINE

## 2023-11-12 PROCEDURE — 2060000000 HC ICU INTERMEDIATE R&B

## 2023-11-12 PROCEDURE — 36415 COLL VENOUS BLD VENIPUNCTURE: CPT

## 2023-11-12 PROCEDURE — 6370000000 HC RX 637 (ALT 250 FOR IP): Performed by: STUDENT IN AN ORGANIZED HEALTH CARE EDUCATION/TRAINING PROGRAM

## 2023-11-12 PROCEDURE — 2700000000 HC OXYGEN THERAPY PER DAY

## 2023-11-12 PROCEDURE — 2580000003 HC RX 258: Performed by: STUDENT IN AN ORGANIZED HEALTH CARE EDUCATION/TRAINING PROGRAM

## 2023-11-12 PROCEDURE — 80048 BASIC METABOLIC PNL TOTAL CA: CPT

## 2023-11-12 PROCEDURE — 6370000000 HC RX 637 (ALT 250 FOR IP): Performed by: INTERNAL MEDICINE

## 2023-11-12 PROCEDURE — 94640 AIRWAY INHALATION TREATMENT: CPT

## 2023-11-12 PROCEDURE — 94761 N-INVAS EAR/PLS OXIMETRY MLT: CPT

## 2023-11-12 RX ORDER — BUMETANIDE 1 MG/1
0.5 TABLET ORAL DAILY
Status: DISCONTINUED | OUTPATIENT
Start: 2023-11-13 | End: 2023-11-14

## 2023-11-12 RX ADMIN — BUSPIRONE HYDROCHLORIDE 10 MG: 10 TABLET ORAL at 13:43

## 2023-11-12 RX ADMIN — BUSPIRONE HYDROCHLORIDE 10 MG: 10 TABLET ORAL at 20:08

## 2023-11-12 RX ADMIN — FLUOXETINE 40 MG: 20 CAPSULE ORAL at 08:50

## 2023-11-12 RX ADMIN — POTASSIUM BICARBONATE 20 MEQ: 782 TABLET, EFFERVESCENT ORAL at 08:50

## 2023-11-12 RX ADMIN — SODIUM CHLORIDE, PRESERVATIVE FREE 10 ML: 5 INJECTION INTRAVENOUS at 08:50

## 2023-11-12 RX ADMIN — BUPROPION HYDROCHLORIDE 150 MG: 150 TABLET, EXTENDED RELEASE ORAL at 08:50

## 2023-11-12 RX ADMIN — BUSPIRONE HYDROCHLORIDE 10 MG: 10 TABLET ORAL at 08:50

## 2023-11-12 RX ADMIN — ARFORMOTEROL TARTRATE: 15 SOLUTION RESPIRATORY (INHALATION) at 19:53

## 2023-11-12 RX ADMIN — PANTOPRAZOLE SODIUM 40 MG: 40 TABLET, DELAYED RELEASE ORAL at 06:21

## 2023-11-12 RX ADMIN — BUMETANIDE 1 MG: 1 TABLET ORAL at 08:50

## 2023-11-12 RX ADMIN — SODIUM CHLORIDE, PRESERVATIVE FREE 10 ML: 5 INJECTION INTRAVENOUS at 20:09

## 2023-11-12 RX ADMIN — ARFORMOTEROL TARTRATE: 15 SOLUTION RESPIRATORY (INHALATION) at 06:43

## 2023-11-12 NOTE — PLAN OF CARE
Problem: Respiratory - Adult  Goal: Achieves optimal ventilation and oxygenation  11/12/2023 1030 by Juan Antonio Bains RN  Outcome: Progressing  11/12/2023 0644 by Gama Trivedi RCP  Outcome: Progressing  11/11/2023 2051 by Balaji Acevedo, RT  Outcome: Progressing     Problem: Skin/Tissue Integrity - Adult  Goal: Skin integrity remains intact  Outcome: Progressing  Flowsheets (Taken 11/12/2023 0825)  Skin Integrity Remains Intact: Monitor for areas of redness and/or skin breakdown

## 2023-11-12 NOTE — PROGRESS NOTES
2337  Blood pressure soft but MAP is above 65. Notified Riddhi Jennings NP. Advised to recheck at midnight. 0018  Repeat BP improved. Updated NP.    0700  Bedside and Verbal shift change report given to [address] (oncoming nurse) by Greyson Wallace (offgoing nurse). Report included the following information Nurse Handoff Report, ED SBAR, Intake/Output, MAR, Recent Results, and Cardiac Rhythm NSR .

## 2023-11-13 ENCOUNTER — APPOINTMENT (OUTPATIENT)
Facility: HOSPITAL | Age: 53
DRG: 865 | End: 2023-11-13
Attending: INTERNAL MEDICINE
Payer: COMMERCIAL

## 2023-11-13 PROBLEM — J06.9 VIRAL URI: Status: ACTIVE | Noted: 2023-11-13

## 2023-11-13 LAB
ANION GAP SERPL CALC-SCNC: 10 MMOL/L (ref 5–15)
BUN SERPL-MCNC: 23 MG/DL (ref 6–20)
BUN/CREAT SERPL: 13 (ref 12–20)
CALCIUM SERPL-MCNC: 9.3 MG/DL (ref 8.5–10.1)
CHLORIDE SERPL-SCNC: 106 MMOL/L (ref 97–108)
CO2 SERPL-SCNC: 22 MMOL/L (ref 21–32)
CREAT SERPL-MCNC: 1.77 MG/DL (ref 0.55–1.02)
ECHO BSA: 1.88 M2
ECHO LV FRACTIONAL SHORTENING: 47 % (ref 28–44)
ECHO LV INTERNAL DIMENSION DIASTOLE INDEX: 2.07 CM/M2
ECHO LV INTERNAL DIMENSION DIASTOLIC: 3.8 CM (ref 3.9–5.3)
ECHO LV INTERNAL DIMENSION SYSTOLIC INDEX: 1.09 CM/M2
ECHO LV INTERNAL DIMENSION SYSTOLIC: 2 CM
ECHO LV IVSD: 0.8 CM (ref 0.6–0.9)
ECHO LV MASS 2D: 86 G (ref 67–162)
ECHO LV MASS INDEX 2D: 46.7 G/M2 (ref 43–95)
ECHO LV POSTERIOR WALL DIASTOLIC: 0.8 CM (ref 0.6–0.9)
ECHO LV RELATIVE WALL THICKNESS RATIO: 0.42
ECHO RV INTERNAL DIMENSION: 4.8 CM
ECHO TV REGURGITANT MAX VELOCITY: 3.53 M/S
ECHO TV REGURGITANT PEAK GRADIENT: 50 MMHG
GLUCOSE SERPL-MCNC: 105 MG/DL (ref 65–100)
POTASSIUM SERPL-SCNC: 3.9 MMOL/L (ref 3.5–5.1)
SODIUM SERPL-SCNC: 138 MMOL/L (ref 136–145)

## 2023-11-13 PROCEDURE — 94640 AIRWAY INHALATION TREATMENT: CPT

## 2023-11-13 PROCEDURE — 6370000000 HC RX 637 (ALT 250 FOR IP): Performed by: INTERNAL MEDICINE

## 2023-11-13 PROCEDURE — 6370000000 HC RX 637 (ALT 250 FOR IP): Performed by: STUDENT IN AN ORGANIZED HEALTH CARE EDUCATION/TRAINING PROGRAM

## 2023-11-13 PROCEDURE — 6360000002 HC RX W HCPCS: Performed by: INTERNAL MEDICINE

## 2023-11-13 PROCEDURE — 2060000000 HC ICU INTERMEDIATE R&B

## 2023-11-13 PROCEDURE — 99232 SBSQ HOSP IP/OBS MODERATE 35: CPT | Performed by: SPECIALIST

## 2023-11-13 PROCEDURE — 94761 N-INVAS EAR/PLS OXIMETRY MLT: CPT

## 2023-11-13 PROCEDURE — 93308 TTE F-UP OR LMTD: CPT

## 2023-11-13 PROCEDURE — APPSS30 APP SPLIT SHARED TIME 16-30 MINUTES: Performed by: NURSE PRACTITIONER

## 2023-11-13 PROCEDURE — 94668 MNPJ CHEST WALL SBSQ: CPT

## 2023-11-13 PROCEDURE — 93308 TTE F-UP OR LMTD: CPT | Performed by: SPECIALIST

## 2023-11-13 PROCEDURE — 93321 DOPPLER ECHO F-UP/LMTD STD: CPT | Performed by: SPECIALIST

## 2023-11-13 PROCEDURE — 2700000000 HC OXYGEN THERAPY PER DAY

## 2023-11-13 PROCEDURE — 2580000003 HC RX 258: Performed by: STUDENT IN AN ORGANIZED HEALTH CARE EDUCATION/TRAINING PROGRAM

## 2023-11-13 PROCEDURE — 36415 COLL VENOUS BLD VENIPUNCTURE: CPT

## 2023-11-13 PROCEDURE — 80048 BASIC METABOLIC PNL TOTAL CA: CPT

## 2023-11-13 RX ADMIN — SODIUM CHLORIDE, PRESERVATIVE FREE 10 ML: 5 INJECTION INTRAVENOUS at 08:33

## 2023-11-13 RX ADMIN — ARFORMOTEROL TARTRATE: 15 SOLUTION RESPIRATORY (INHALATION) at 07:16

## 2023-11-13 RX ADMIN — SODIUM CHLORIDE, PRESERVATIVE FREE 10 ML: 5 INJECTION INTRAVENOUS at 20:32

## 2023-11-13 RX ADMIN — FLUOXETINE 40 MG: 20 CAPSULE ORAL at 08:33

## 2023-11-13 RX ADMIN — BUPROPION HYDROCHLORIDE 150 MG: 150 TABLET, EXTENDED RELEASE ORAL at 08:32

## 2023-11-13 RX ADMIN — BUSPIRONE HYDROCHLORIDE 10 MG: 10 TABLET ORAL at 08:33

## 2023-11-13 RX ADMIN — POTASSIUM BICARBONATE 20 MEQ: 782 TABLET, EFFERVESCENT ORAL at 08:32

## 2023-11-13 RX ADMIN — ARFORMOTEROL TARTRATE: 15 SOLUTION RESPIRATORY (INHALATION) at 19:31

## 2023-11-13 RX ADMIN — BUSPIRONE HYDROCHLORIDE 10 MG: 10 TABLET ORAL at 20:32

## 2023-11-13 RX ADMIN — PANTOPRAZOLE SODIUM 40 MG: 40 TABLET, DELAYED RELEASE ORAL at 06:15

## 2023-11-13 RX ADMIN — BUSPIRONE HYDROCHLORIDE 10 MG: 10 TABLET ORAL at 13:24

## 2023-11-13 NOTE — PROGRESS NOTES
[address] [facility]                    Cardiology Care Note     []Initial Encounter     [x]Follow-up    Patient Name: Manisha Ashley - HCX:[id number]  Primary Cardiologist: Neel Marquez MD  Consulting Cardiologist: Bertha Monique MD     Reason for encounter:    HPI:   Codie Scott is a 48 y.o. female with PMH significant for HFpEF, COPD, Obesity s/p gastric bypass presenting with chest pain, non radiating, anterior chest wall and abdominal wall, associated with SOB. Moderate intensity pain. Trop eval was normal.     EKG non ischemic. Feels well today without pain. Continues to have MCDOWELL ambulating to BR. Most recent HS troponins:  No results for input(s): \"TROPHS\", \"CKMB\" in the last 72 hours. Assessment and Plan     Chest pain, MCDOWELL, pericardial effusion (unclear etiology): TTE w/ now large pericardial effusion, had mod in Sept but viral panel neg at that time (did not follow up with our office), wanted to rule out amyloid/sarcoidosis in OP setting as well. No sign of tamponade. No PE on chest CTA. Hold off on stress test for now. Repeat limited Echo today    HFpEF, RV dysfunction, mod to severe pulm HTN likely due to COPD: Bumex per BP/Cr tolerance. Worsening RV function recent echo from prior w/ severely dilated RV, no PE on chest CTA    Obesity    Emphysema: found on prior chest CT, now on 2L NC, pulmonary following    MARILIA on CKD: Cr 1.64    +Rhinovirus          ____________________________________________________________    Cardiac testing  11/06/23    ECHO (TTE) LIMITED (PRN CONTRAST/BUBBLE/STRAIN/3D) 11/07/2023  3:50 PM (Final)    Interpretation Summary    Left Ventricle: Low normal left ventricular systolic function with a visually estimated EF of 50 - 55%. Left ventricle size is normal. Normal wall thickness. Septal flattening in diastole and systole consistent with right ventricular volume and pressure overload.     Right Ventricle: Right ventricle is severely

## 2023-11-13 NOTE — CARE COORDINATION
SHANNON Note:  Pulmonology following-pt on 2-3L O2;   [employer] to provide home O2  Cardiology following-TTE showed large pleural effusion  Repeat limited ECHO done today  ORACIO Mendez

## 2023-11-13 NOTE — PLAN OF CARE
Problem: Respiratory - Adult  Goal: Achieves optimal ventilation and oxygenation  11/13/2023 0957 by Payal Stevens RN  Outcome: Progressing  Flowsheets (Taken 11/13/2023 0800)  Achieves optimal ventilation and oxygenation:   Assess for changes in respiratory status   Assess for changes in mentation and behavior  11/12/2023 2000 by RT Homar  Outcome: Progressing     Problem: Cardiovascular - Adult  Goal: Maintains optimal cardiac output and hemodynamic stability  Outcome: Progressing  Flowsheets (Taken 11/13/2023 0800)  Maintains optimal cardiac output and hemodynamic stability: Monitor blood pressure and heart rate  Goal: Absence of cardiac dysrhythmias or at baseline  Outcome: Progressing     Problem: Skin/Tissue Integrity - Adult  Goal: Skin integrity remains intact  Outcome: Progressing     Problem: Metabolic/Fluid and Electrolytes - Adult  Goal: Electrolytes maintained within normal limits  Outcome: Progressing     Problem: Chronic Conditions and Co-morbidities  Goal: Patient's chronic conditions and co-morbidity symptoms are monitored and maintained or improved  Outcome: Progressing

## 2023-11-13 NOTE — PROGRESS NOTES
Name: [facility]: [facility]   : 1970 Admit Date: 2023   Phone: [phone]  Room: [facility]/01   PCP: No primary care provider on file. MRN: [id number]   Date: 2023  Code: Full Code          Chart and notes reviewed. Data reviewed. I review the patient's current medications in the medical record at each encounter. I have evaluated and examined the patient. History of Present Illness:  Ms. Vianey Watters is a 47 yo woman with a history of emphsyema (seen on CT), HFpEF, CKD, and prior gastric bypass who is admitted with acute respiratory failure with hypoxia. She was hospitalized in September and was planned for out patient follow-up with PFTs, but did not do so. She comes in with shortness of breath, chest pain/pressure and abdominal pain. She has shortness of breath is both at rest and with exertion, but not consistent. Some chest tightness with deep breaths. No cough, wheezing. Found to be hypoxic, requiring up to 6L NC. 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 Her recent testing done during his last hospitalization returned MM phenotype with normal levels. Labs: cr 1.47, proBNP 14,491, alpha-1 MM level 157, WBC 9.0, d-dimer 1.51    Images reviewed:  CXR 2023: lungs clear    Interval history  Afebrile  BP soft/stable  Sats 86-93% on 2L  500ml UOP documented  Creat 1.77--bumped  RVP with rhinovirus  Urine cx no growth      chest CTA personally reviewed. No evidence of pulmonary embolism. Chronic cardiomegaly with moderate pericardial effusion, not significantly changed. Chronic increased attenuation of both lungs with prominent interstitial markings may represent mild pulmonary edema or atypical/viral pneumonia. 117 ECHO: EF 50-55%; RV severely dilated; severely reduced RVSF; mod to severe pulm HTN; large pericardial effusion (> 2cm) - no tamponade    ROS: SOB improving but feels very tired today.         Past Medical albuterol  Diuresis as tolerated  Stress test on hold for now as effusion suspected cause for her symptoms; cardiology following and plans for repeat ECHO in 1 week  Needs outpatient PFTs  Will need further look into the diagnosis of A1AT mentioned in the chart as our records show something different and she was not aware that this had been diagnosed elsewhere      DOYLE Monroy - NP

## 2023-11-14 VITALS
TEMPERATURE: 97.7 F | HEIGHT: 64 IN | HEART RATE: 95 BPM | DIASTOLIC BLOOD PRESSURE: 81 MMHG | WEIGHT: 173 LBS | BODY MASS INDEX: 29.53 KG/M2 | SYSTOLIC BLOOD PRESSURE: 102 MMHG | RESPIRATION RATE: 18 BRPM | OXYGEN SATURATION: 100 %

## 2023-11-14 LAB
ANION GAP SERPL CALC-SCNC: 8 MMOL/L (ref 5–15)
BASOPHILS # BLD: 0.1 K/UL (ref 0–0.1)
BASOPHILS NFR BLD: 1 % (ref 0–1)
BUN SERPL-MCNC: 19 MG/DL (ref 6–20)
BUN/CREAT SERPL: 10 (ref 12–20)
CALCIUM SERPL-MCNC: 8.9 MG/DL (ref 8.5–10.1)
CHLORIDE SERPL-SCNC: 109 MMOL/L (ref 97–108)
CO2 SERPL-SCNC: 22 MMOL/L (ref 21–32)
CREAT SERPL-MCNC: 1.87 MG/DL (ref 0.55–1.02)
DIFFERENTIAL METHOD BLD: ABNORMAL
ECHO BSA: 1.88 M2
EOSINOPHIL # BLD: 0.1 K/UL (ref 0–0.4)
EOSINOPHIL NFR BLD: 2 % (ref 0–7)
ERYTHROCYTE [DISTWIDTH] IN BLOOD BY AUTOMATED COUNT: 15.7 % (ref 11.5–14.5)
GLUCOSE SERPL-MCNC: 91 MG/DL (ref 65–100)
HCT VFR BLD AUTO: 40.1 % (ref 35–47)
HGB BLD-MCNC: 12.5 G/DL (ref 11.5–16)
IMM GRANULOCYTES # BLD AUTO: 0 K/UL (ref 0–0.04)
IMM GRANULOCYTES NFR BLD AUTO: 0 % (ref 0–0.5)
LYMPHOCYTES # BLD: 2.5 K/UL (ref 0.8–3.5)
LYMPHOCYTES NFR BLD: 27 % (ref 12–49)
MCH RBC QN AUTO: 29 PG (ref 26–34)
MCHC RBC AUTO-ENTMCNC: 31.2 G/DL (ref 30–36.5)
MCV RBC AUTO: 93 FL (ref 80–99)
MONOCYTES # BLD: 0.6 K/UL (ref 0–1)
MONOCYTES NFR BLD: 7 % (ref 5–13)
NEUTS SEG # BLD: 5.7 K/UL (ref 1.8–8)
NEUTS SEG NFR BLD: 63 % (ref 32–75)
NRBC # BLD: 0 K/UL (ref 0–0.01)
NRBC BLD-RTO: 0 PER 100 WBC
PLATELET # BLD AUTO: 255 K/UL (ref 150–400)
PMV BLD AUTO: 11.2 FL (ref 8.9–12.9)
POTASSIUM SERPL-SCNC: 4 MMOL/L (ref 3.5–5.1)
RBC # BLD AUTO: 4.31 M/UL (ref 3.8–5.2)
SODIUM SERPL-SCNC: 139 MMOL/L (ref 136–145)
WBC # BLD AUTO: 9 K/UL (ref 3.6–11)

## 2023-11-14 PROCEDURE — 6370000000 HC RX 637 (ALT 250 FOR IP): Performed by: INTERNAL MEDICINE

## 2023-11-14 PROCEDURE — 2580000003 HC RX 258: Performed by: STUDENT IN AN ORGANIZED HEALTH CARE EDUCATION/TRAINING PROGRAM

## 2023-11-14 PROCEDURE — C1894 INTRO/SHEATH, NON-LASER: HCPCS | Performed by: STUDENT IN AN ORGANIZED HEALTH CARE EDUCATION/TRAINING PROGRAM

## 2023-11-14 PROCEDURE — 93451 RIGHT HEART CATH: CPT | Performed by: STUDENT IN AN ORGANIZED HEALTH CARE EDUCATION/TRAINING PROGRAM

## 2023-11-14 PROCEDURE — 99232 SBSQ HOSP IP/OBS MODERATE 35: CPT | Performed by: SPECIALIST

## 2023-11-14 PROCEDURE — 4A023N6 MEASUREMENT OF CARDIAC SAMPLING AND PRESSURE, RIGHT HEART, PERCUTANEOUS APPROACH: ICD-10-PCS | Performed by: STUDENT IN AN ORGANIZED HEALTH CARE EDUCATION/TRAINING PROGRAM

## 2023-11-14 PROCEDURE — 6360000002 HC RX W HCPCS: Performed by: INTERNAL MEDICINE

## 2023-11-14 PROCEDURE — 94640 AIRWAY INHALATION TREATMENT: CPT

## 2023-11-14 PROCEDURE — 94761 N-INVAS EAR/PLS OXIMETRY MLT: CPT

## 2023-11-14 PROCEDURE — 2500000003 HC RX 250 WO HCPCS: Performed by: STUDENT IN AN ORGANIZED HEALTH CARE EDUCATION/TRAINING PROGRAM

## 2023-11-14 PROCEDURE — 6370000000 HC RX 637 (ALT 250 FOR IP): Performed by: STUDENT IN AN ORGANIZED HEALTH CARE EDUCATION/TRAINING PROGRAM

## 2023-11-14 PROCEDURE — 36415 COLL VENOUS BLD VENIPUNCTURE: CPT

## 2023-11-14 PROCEDURE — 2700000000 HC OXYGEN THERAPY PER DAY

## 2023-11-14 PROCEDURE — 94668 MNPJ CHEST WALL SBSQ: CPT

## 2023-11-14 PROCEDURE — B2111ZZ FLUOROSCOPY OF MULTIPLE CORONARY ARTERIES USING LOW OSMOLAR CONTRAST: ICD-10-PCS | Performed by: STUDENT IN AN ORGANIZED HEALTH CARE EDUCATION/TRAINING PROGRAM

## 2023-11-14 PROCEDURE — C1725 CATH, TRANSLUMIN NON-LASER: HCPCS | Performed by: STUDENT IN AN ORGANIZED HEALTH CARE EDUCATION/TRAINING PROGRAM

## 2023-11-14 PROCEDURE — 85025 COMPLETE CBC W/AUTO DIFF WBC: CPT

## 2023-11-14 PROCEDURE — 2709999900 HC NON-CHARGEABLE SUPPLY: Performed by: STUDENT IN AN ORGANIZED HEALTH CARE EDUCATION/TRAINING PROGRAM

## 2023-11-14 PROCEDURE — 80048 BASIC METABOLIC PNL TOTAL CA: CPT

## 2023-11-14 RX ORDER — LIDOCAINE HYDROCHLORIDE 10 MG/ML
INJECTION, SOLUTION INFILTRATION; PERINEURAL PRN
Status: DISCONTINUED | OUTPATIENT
Start: 2023-11-14 | End: 2023-11-14 | Stop reason: HOSPADM

## 2023-11-14 RX ADMIN — BUPROPION HYDROCHLORIDE 150 MG: 150 TABLET, EXTENDED RELEASE ORAL at 08:44

## 2023-11-14 RX ADMIN — BUSPIRONE HYDROCHLORIDE 10 MG: 10 TABLET ORAL at 08:44

## 2023-11-14 RX ADMIN — SODIUM CHLORIDE, PRESERVATIVE FREE 10 ML: 5 INJECTION INTRAVENOUS at 08:45

## 2023-11-14 RX ADMIN — FLUOXETINE 40 MG: 20 CAPSULE ORAL at 08:44

## 2023-11-14 RX ADMIN — BUSPIRONE HYDROCHLORIDE 10 MG: 10 TABLET ORAL at 14:45

## 2023-11-14 RX ADMIN — ARFORMOTEROL TARTRATE: 15 SOLUTION RESPIRATORY (INHALATION) at 07:04

## 2023-11-14 RX ADMIN — PANTOPRAZOLE SODIUM 40 MG: 40 TABLET, DELAYED RELEASE ORAL at 06:13

## 2023-11-14 NOTE — ADT AUTH CERT
Progress Notes by Genet Membreno MD at 2023  2:57 PM    Author: Genet Membreno MD Service: Internal Medicine Author Type: Physician   Filed: 2023  2:58 PM Date of Service: 2023  2:57 PM Status: Signed   : Genet Membreno MD (Physician)   Expand All Collapse All    [address] [facility]ist Progress Note     NAME:            Connor Villegas        :               1970  MRM:              [id number]     Date/Time of service 2023  2:57 PM              Assessment and Plan:      Acute respiratory failure with hypoxia / Shortness of breath - POA, suspecting cor pulmonale exacerbation by rhinovirus. Now on 3L. Feels better. Pulmonology following cor pulmonale on ECHO. Rhinovirus on RVP. Normal xray. Low suspicion for pneumonia. No Abx     Cor pulmonale / Pulmonary hypertension / CHF (congestive heart failure), NYHA class I, chronic, diastolic / Hypotension - ECHO shows severe right heart failure, a new Dx. Effusion too. Cardiology following. Continue Bumex but dose lowered today due to hypotension. Repeat echo today looks worse with further reduced EF and with hypokinesis. Pericardial effusion is present but unchanged. COPD (chronic obstructive pulmonary disease) - POA, probable, exacerbated by rhinovirus. No history of smoking. Continue Dulera. Pulmonology following and recommends outpatient PFTs and Re eval alpha 1 trypsin inhibiter studies outpatient. Prn duonebs     CKD (chronic kidney disease) stage 3 - POA, monitor. Stable. Continue to monitor with diuresis      Anxiety and depression - POA, resumed fluoxetine and buspirone     Gastric bypass status for obesity / Hypoalbuminemia - PPI      Pyuria - Initial UA contaminated. RE check clear         Subjective:      No complaints. Still short of breath with exertion                  Objective:      Last 24hrs VS reviewed since prior progress note.  Most recent are:     Vitals

## 2023-11-14 NOTE — PROGRESS NOTES
-    Name: [facility]   : 1970 Admit Date: 2023   Phone: [phone]  Room: [facility]/01   PCP: No primary care provider on file. MRN: [id number]   Date: 2023  Code: Full Code          Chart and notes reviewed. Data reviewed. I review the patient's current medications in the medical record at each encounter. I have evaluated and examined the patient. History of Present Illness:  Ms. Karie Phillips is a 47 yo woman with a history of emphsyema (seen on CT), HFpEF, CKD, and prior gastric bypass who is admitted with acute respiratory failure with hypoxia. She was hospitalized in September and was planned for out patient follow-up with PFTs, but did not do so. She comes in with shortness of breath, chest pain/pressure and abdominal pain. She has shortness of breath is both at rest and with exertion, but not consistent. Some chest tightness with deep breaths. No cough, wheezing. Found to be hypoxic, requiring up to 6L NC. 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 Her recent testing done during his last hospitalization returned MM phenotype with normal levels. Labs: cr 1.47, proBNP 14,491, alpha-1 MM level 157, WBC 9.0, d-dimer 1.51    Images reviewed:  CXR 2023: lungs clear    Interval history  Afebrile  BP stable  Sats 96-97% on RA  No UOP documented  Creat 1.87--bumped  RVP with rhinovirus  Urine cx no growth   TTE : EF 45-50%, global hypokinesis present, moderately reduced systolic function, large pericardial effusion present     chest CTA personally reviewed. No evidence of pulmonary embolism. Chronic cardiomegaly with moderate pericardial effusion, not significantly changed. Chronic increased attenuation of both lungs with prominent interstitial markings may represent mild pulmonary edema or atypical/viral pneumonia.      117 ECHO: EF 50-55%; RV severely dilated; severely reduced RVSF; mod to severe pulm HTN; large pericardial effusion (> 2cm) - no tamponade    ROS:  Feeling better today. Not as SOB when ambulating as she was yesterday, but still winded after getting up to the bathroom and back. No sputum production. For [facility] today.         Past Medical History:   Diagnosis Date    Anxiety and depression     CHF (congestive heart failure), NYHA class I, chronic, diastolic ([facility])     CKD (chronic kidney disease) stage 3, GFR 30-59 ml/min ([facility])     COPD (chronic obstructive pulmonary disease) ([facility])     Cor pulmonale (chronic) ([facility])     Gastric bypass status for obesity     Pulmonary hypertension ([facility])        Past Surgical History:   Procedure Laterality Date    SHOULDER SURGERY         Family History   Problem Relation Age of Onset    Heart Disease Neg Hx        Social History     Tobacco Use    Smoking status: Never    Smokeless tobacco: Never   Substance Use Topics    Alcohol use: Yes     Comment: socially       Allergies   Allergen Reactions    Ceftriaxone Itching    Ciprofloxacin Itching    Sulfa Antibiotics Itching       Current Facility-Administered Medications   Medication Dose Route Frequency    [Held by provider] bumetanide (BUMEX) tablet 0.5 mg  0.5 mg Oral Daily    pantoprazole (PROTONIX) tablet 40 mg  40 mg Oral QAM AC    FLUoxetine (PROZAC) capsule 40 mg  40 mg Oral Daily    ipratropium 0.5 mg-albuterol 2.5 mg (DUONEB) nebulizer solution 1 Dose  1 Dose Inhalation Q6H PRN    potassium bicarb-citric acid (EFFER-K) effervescent tablet 20 mEq  20 mEq Oral Daily    arformoterol 15 mcg-budesonide 0.5 mg neb solution   Nebulization BID RT    sodium chloride flush 0.9 % injection 5-40 mL  5-40 mL IntraVENous 2 times per day    sodium chloride flush 0.9 % injection 5-40 mL  5-40 mL IntraVENous PRN    0.9 % sodium chloride infusion   IntraVENous PRN    ondansetron (ZOFRAN-ODT) disintegrating tablet 4 mg  4 mg Oral Q8H PRN    Or    ondansetron (ZOFRAN) injection 4 mg  4 mg IntraVENous Q6H PRN    polyethylene glycol (GLYCOLAX)

## 2023-11-14 NOTE — PROGRESS NOTES
section chest CT following intravenous administration of  nonionic contrast 100 mL of isovue 370 according to departmental PE protocol. Coronal and sagittal reformats were performed. 3D post processing was performed. CT dose reduction was achieved through the use of a standardized protocol  tailored for this examination and automatic exposure control for dose  modulation. FINDINGS: This is a good quality study for the evaluation of pulmonary embolism  to the first subsegmental arterial level. There is no pulmonary embolism to this  level. THYROID: No nodule. MEDIASTINUM: Numerous small mediastinal lymph nodes, none of which are  pathologically enlarged  AMBERLY: No mass or lymphadenopathy. THORACIC AORTA: No aneurysm. HEART: Cardiomegaly with chronic moderate pericardial effusion, not  significantly changed  ESOPHAGUS: Tiny hiatal hernia  TRACHEA/BRONCHI: Patent. PLEURA: No effusion or pneumothorax. LUNGS: Pulmonary edema pattern, similar to prior study  UPPER ABDOMEN: Partially imaged. No acute pathology. BONES: No aggressive bone lesion or fracture. Impression  1. No evidence of pulmonary embolism. 2. Chronic cardiomegaly with moderate pericardial effusion, not significantly  changed. 3. Chronic increased attenuation of both lungs with prominent interstitial  markings may represent mild pulmonary edema or atypical/viral pneumonia.       Lab Results   Component Value Date/Time     11/14/2023 12:16 AM    K 4.0 11/14/2023 12:16 AM     11/14/2023 12:16 AM    CO2 22 11/14/2023 12:16 AM    BUN 19 11/14/2023 12:16 AM    CREATININE 1.87 11/14/2023 12:16 AM    GLUCOSE 91 11/14/2023 12:16 AM    CALCIUM 8.9 11/14/2023 12:16 AM    LABGLOM 32 11/14/2023 12:16 AM      No results found for: \"BNP\"  Lab Results   Component Value Date    WBC 9.0 11/14/2023    HGB 12.5 11/14/2023    HCT 40.1 11/14/2023    MCV 93.0 11/14/2023     11/14/2023     No results for input(s): \"CHOL\", \"HDLC\", \"LDLC\", Oral, Daily, Clearence MD Kelly, 150 mg at 11/14/23 DOYLE Cabrera NP    [facility]  Call center: (I) [phone]  (J) [phone]      CC: No primary care provider on file.

## 2023-11-14 NOTE — CARE COORDINATION
CM Note:  Cardiology following-s/p RHC today: severe pulmonary HTN  Pulmonology following-pt on 2L O2; to follow up OP for PFT's  Pt is set up with home O2 from [facility] to transport herself home at d/c  Peterson Madrigal

## 2023-11-14 NOTE — DISCHARGE INSTRUCTIONS
Hospital Medicine DISCHARGE INSTRUCTIONS    NAME:   Nandini Mata   :  1970   MRN:  [id number]     Date:     2023    ADMIT DATE: 2023     DISCHARGE DATE: 2023     PRINCIPAL ADMITTING DIAGNOSIS:  Shortness of breath [R06.02]  Chest pain [R07.9]  Hypoxia [R09.02]  Acute kidney injury ([address]) [N17.9]  CHF (congestive heart failure), NYHA class I, chronic, diastolic (HCC) [U68.50]  Chest pain, unspecified type [R07.9]    Discharge Diagnoses:    Acute respiratory failure with hypoxia (HCC)    Viral URI    Pericardial effusion    Shortness of breath    Chest pain    Anxiety and depression    CHF (congestive heart failure), NYHA class I, chronic, diastolic (HCC)    COPD (chronic obstructive pulmonary disease) ([facility])    Gastric bypass status for obesity    CKD (chronic kidney disease) stage 3, GFR 30-59 ml/min (HCC)    Hypoalbuminemia    Cor pulmonale (chronic) (HCC)    Pulmonary hypertension (HCC)     MEDICATIONS:    It is important that medications are taken exactly as they are prescribed on the discharge medication instructions and keep them your  in the bottles provided by the pharmacist.   Keep a list of the medication names, dosages, and times to be taken at all times. Do not take other medications without consulting your doctor.      Recommended diet:  regular diet    Recommended activity: activity as tolerated    Post discharge care:    Notify follow up health care provider or return to the emergency department if you cannot get hold of your doctor if you feel worse or experience symptoms similar to those that brought you to hospital    William Brower MD  [address] [facility]  [phone]    Schedule an appointment as soon as possible for a visit in 1 week(s)      Isacc Bhandari MD  [address]  [phone]    Follow up on 2023  1:20 pm    [address] [facility], [id number] [place] [address]

## 2023-11-14 NOTE — DISCHARGE SUMMARY
[address] [place] [address]  Tel: [phone]    Hospital Medicine Discharge Summary    Patient ID:    Maame Raw  Age:              48 y.o.    : 1970  MRN:             [id number]     PCP: No primary care provider on file. Date of Admission: 2023    Date of Discharge:  2023    Discharge Diagnoses:  Principal Problem:    Viral URI    Acute respiratory failure with hypoxia (HCC)    Pulmonary hypertension (HCC)    Pericardial effusion    Shortness of breath    Chest pain    Anxiety and depression    CHF (congestive heart failure), NYHA class I, chronic, diastolic (HCC)    COPD (chronic obstructive pulmonary disease) ([facility])    Gastric bypass status for obesity    CKD (chronic kidney disease) stage 3, GFR 30-59 ml/min (HCC)    Hypoalbuminemia    Cor pulmonale (chronic) ([address])     Reason for admission:    Shortness of breath [R06.02]  Chest pain [R07.9]  Hypoxia [R09.02]  Acute kidney injury ([address]) [N17.9]  CHF (congestive heart failure), NYHA class I, chronic, diastolic ([address]) [C72.86]  Chest pain, unspecified type [R07.9]    Diagnostic testing:    Laboratory data reviewed and independently interpreted:    Recent Labs     23  0016   WBC 9.0   HGB 12.5   HCT 40.1   RBC 4.31   MCV 93.0   MCH 29.0        No results found for: \"LACTA\"  Recent Labs     23  0016 23  0028 23  0016    138 139   K 3.7 3.9 4.0    106 109*   CO2 22 22 22   GLUCOSE 102* 105* 91   BUN 18 23* 19   CREATININE 1.60* 1.77* 1.87*   CALCIUM 9.0 9.3 8.9     No components found for: \"GLUCOSEPOC\"  No results found for: \"CHOL\", \"TRIG\", \"HDL\", \"LDLCALC\", \"LABVLDL\"    Imaging data reviewed:    CTA CHEST W WO CONTRAST    Result Date: 2023  1. No evidence of pulmonary embolism. 2. Chronic cardiomegaly with moderate pericardial effusion, not significantly changed.  3. Chronic increased mometasone-formoterol (DULERA) 100-5 MCG/ACT inhaler Inhale 2 puffs into the lungs 2 times daily  Qty: 13 g, Refills: 0      FLUoxetine (PROZAC) 40 MG capsule Prozac 40 mg capsule   1 tab PO daily-           STOP taking these medications       bumetanide (BUMEX) 1 MG tablet Comments:   Reason for Stopping:         potassium chloride (KLOR-CON M) 20 MEQ extended release tablet Comments:   Reason for Stopping:               Fadi Edwards MD  [address]  [phone]    Schedule an appointment as soon as possible for a visit in 1 week(s)      Kailee Wilson MD  [address]  [phone]    Follow up on 11/17/2023  1:20 pm    [address] [place] [address] [address]  [phone]  Follow up  concentrator, portables and supplies      Follow-up tests or labs: As noted above    Discharge Condition: Stable    Disposition: home    Time taken to co-ordinate and arrange discharge:  35 minutes.     Signed:  Amari Fonseca MD       11/14/2023   1:49 PM

## 2023-11-14 NOTE — PROGRESS NOTES
Comprehensive Nutrition Assessment    Type and Reason for Visit: Initial, RD Nutrition Re-Screen/LOS    Nutrition Recommendations/Plan:   Continue Regular diet   Obtain standing weight        Malnutrition Assessment:  Malnutrition Status:  No malnutrition (11/14/23 1509)    Context:  Acute Illness     Findings of the 6 clinical characteristics of malnutrition:  Energy Intake:  Mild decrease in energy intake (Comment)  Weight Loss:  No significant weight loss     Body Fat Loss:  No significant body fat loss     Muscle Mass Loss:  No significant muscle mass loss    Fluid Accumulation:  No significant fluid accumulation     Strength:  Not Performed     Nutrition Assessment:    48year old Female admitted with Shortness of breath [R06.02]  Chest pain [R07.9]  Hypoxia [R09.02]  Acute kidney injury (HCC) [N17.9]  CHF (congestive heart failure), NYHA class I, chronic, diastolic (HCC) [S24.24]  Chest pain, unspecified type [R07.9] who has a past medical history of Anxiety and depression, CHF (congestive heart failure), NYHA class I, chronic, diastolic (HCC), CKD (chronic kidney disease) stage 3, GFR 30-59 ml/min (HCC), COPD (chronic obstructive pulmonary disease) ([address]), and Pulmonary hypertension ([address]). No history of GI surgeries except for remote cholecystectomy ~2009. RD visited with patient. She reports she has lost some weight intentionally since about March of this year. It appears to be about 17% of her UBW of 209 lbs over 8 months which may be interpreted as significant on it's own. Nutrition focused physical exam does not indicate muscle loss or low fat stores. RD didn't see edema in lower extremities today. Some weight loss is likely related to chronic conditions. Pt is eating well at most meals. Occasionally, dislikes some foods. Has menu and ordering meals. Pt does not have any nutrition related concerns or questions at this time.        Estimated Daily Nutrient Needs:  Energy Requirements Based On:

## 2023-11-14 NOTE — PROGRESS NOTES
Spiritual Care Assessment/Progress Note  [facility]    Name: Trudi Holstein MRN: [id number]    Age: 48 y.o. Sex: female   Language: English     Date: 11/14/2023            Total Time Calculated: 10 min              Spiritual Assessment begun in [facility] 3 PRO CARE TELE 2  Service Provided For[de-identified] Patient  Referral/Consult From[de-identified] Rounding  Encounter Overview/Reason : Initial Encounter    Spiritual beliefs:      [x] Involved in a yelena tradition/spiritual practice: Magdiel Moctezuma      [] Supported by a yelena community:      [] Claims no spiritual orientation:      [] Seeking spiritual identity:           [] Adheres to an individual form of spirituality:      [] Not able to assess:                Identified resources for coping and support system:   Support System: Spouse, Children       [x] Prayer                  [] Devotional reading               [] Music                  [] Guided Imagery     [] Pet visits                                        [] Other: (COMMENT)     Specific area/focus of visit   Encounter:    Crisis:    Spiritual/Emotional needs: Type: Spiritual Support (Morale Check)  Ritual, Rites and Sacraments:    Grief, Loss, and Adjustments:    Ethics/Mediation:    Behavioral Health:    Palliative Care: Advance Care Planning:      Plan/Referrals:  (Contact spiritual care for further assistance.)    Narrative: Chart review. I visited patient Isaias Claire on [facility] in room 331. Patient was thankful that I checked on her to see how she was doing. Patient has not been seen in 8 days. Patient shared her historical records, medical issues, and Anabaptist affiliation. I met and conversed with the patient and learned what she was concerned about which was her health. Patient is  and has one daughter. I provided empathy, compassion, and provided wellness prayers for her. Patient showed gratitude for the visitation. Contact spiritual care for further assistance.    Keya Jansen MDiv  Staff   [facility] [phone] (SAURABH)

## 2023-11-14 NOTE — PROGRESS NOTES
I have reviewed discharge instructions with the patient. The patient verbalized understanding. Removed PIVs and Telemetry. Reviewed medication. Answered questions.

## 2023-12-04 ENCOUNTER — TELEPHONE (OUTPATIENT)
Age: 53
End: 2023-12-04

## 2023-12-04 NOTE — TELEPHONE ENCOUNTER
Pt is calling to speak to nurse in reference to pt's employer is requiring a FLU and COVID vaccine or either the pt will have to get an exemption form. Pt would like a phone call back.        Pt ph [phone]

## 2023-12-28 ENCOUNTER — TELEPHONE (OUTPATIENT)
Age: 53
End: 2023-12-28

## 2023-12-28 NOTE — TELEPHONE ENCOUNTER
Patient is requesting a note to stay out of work until her upcoming appointment on 1/5/23 with Dr. Ashley Pratt. Hospitalized 11/6 to 11/14 for COR pulmonale, Pulmonary HTN CHF, severy right heart failure, new. States she returned to work not knowing she should have stayed out until her upcoming appointment and now feels worse. Please advise.     Future Appointments   Date Time Provider [address]   1/5/2024  3:00 PM MD COLEEN Parra AMB   4/26/2024  9:30 AM Marcial Neal MD [facility] AMB

## 2023-12-29 NOTE — TELEPHONE ENCOUNTER
Spoke with patient after ID x2 and conveyed plan for letter to keep her out of work until follow up on 01/05/24. Patient will obtain letter from My Chart.

## 2024-01-05 ENCOUNTER — OFFICE VISIT (OUTPATIENT)
Age: 54
End: 2024-01-05
Payer: COMMERCIAL

## 2024-01-05 ENCOUNTER — PATIENT MESSAGE (OUTPATIENT)
Age: 54
End: 2024-01-05

## 2024-01-05 VITALS
HEIGHT: 64 IN | DIASTOLIC BLOOD PRESSURE: 76 MMHG | HEART RATE: 78 BPM | BODY MASS INDEX: 28.51 KG/M2 | OXYGEN SATURATION: 92 % | SYSTOLIC BLOOD PRESSURE: 110 MMHG | WEIGHT: 167 LBS

## 2024-01-05 DIAGNOSIS — I27.20 PULMONARY HYPERTENSION (HCC): Primary | ICD-10-CM

## 2024-01-05 DIAGNOSIS — I50.32 CHF (CONGESTIVE HEART FAILURE), NYHA CLASS I, CHRONIC, DIASTOLIC (HCC): ICD-10-CM

## 2024-01-05 DIAGNOSIS — I31.39 PERICARDIAL EFFUSION: ICD-10-CM

## 2024-01-05 DIAGNOSIS — R07.9 CHEST PAIN, UNSPECIFIED TYPE: ICD-10-CM

## 2024-01-05 PROCEDURE — 99215 OFFICE O/P EST HI 40 MIN: CPT | Performed by: INTERNAL MEDICINE

## 2024-01-05 ASSESSMENT — PATIENT HEALTH QUESTIONNAIRE - PHQ9
1. LITTLE INTEREST OR PLEASURE IN DOING THINGS: 0
SUM OF ALL RESPONSES TO PHQ9 QUESTIONS 1 & 2: 0
SUM OF ALL RESPONSES TO PHQ QUESTIONS 1-9: 0
SUM OF ALL RESPONSES TO PHQ QUESTIONS 1-9: 0
2. FEELING DOWN, DEPRESSED OR HOPELESS: 0
SUM OF ALL RESPONSES TO PHQ QUESTIONS 1-9: 0
SUM OF ALL RESPONSES TO PHQ QUESTIONS 1-9: 0

## 2024-01-05 NOTE — PROGRESS NOTES
Per Dr. Linares- letter to remain out of work until follow up with Pulmonary, referral to [facility] for pulmonary HTN.     Added echo/fup in 3 months.  
Reactions    Ceftriaxone Itching    Ciprofloxacin Itching    Sulfa Antibiotics Itching    Shellfish-Derived Products Itching     Still eats at times, takes benedryl       SOCIAL HISTORY:     Social History     Tobacco Use    Smoking status: Never    Smokeless tobacco: Never   Substance Use Topics    Alcohol use: Yes     Comment: socially       FAMILY HISTORY:     Family History   Problem Relation Age of Onset    Heart Disease Neg Hx        REVIEW OF SYMPTOMS:     Review of Symptoms:  Negative except as above, all other systems reviewed and are negative for a Comprehensive ROS (10+)    PHYSICAL EXAM:     Physical Exam:  /76 (Site: Left Upper Arm, Position: Sitting, Cuff Size: Medium Adult)   Pulse 78   Ht 1.626 m (5' 4\")   Wt 75.8 kg (167 lb)   SpO2 92% Comment: patient states she is on 2 liters of oxygen at home  BMI 28.67 kg/m²     General: alert, cooperative, no distress, appears stated age  Neck: supple, symmetrical, trachea midline, no adenopathy, thyroid: not enlarged, symmetric, no tenderness/mass/nodules, no carotid bruit, and no JVD  Lungs: clear to auscultation bilaterally  Heart: regular rate and rhythm, S1, S2 normal, no murmur, no click, rub  +gallop  Abdomen: soft, non tender  Extremities: extremities normal, atraumatic, no cyanosis or edema  Skin: No significant rashes  MSKTL: Overall good ROM ext  Neuro: Grossly intact  Psych: Appropriate affect    LABS / OTHER STUDIES:     Lab Results   Component Value Date/Time     11/14/2023 12:16 AM    K 4.0 11/14/2023 12:16 AM     11/14/2023 12:16 AM    CO2 22 11/14/2023 12:16 AM    BUN 19 11/14/2023 12:16 AM    GFRAA 50 07/30/2022 06:16 PM    GLOB 3.3 11/08/2023 01:00 AM    ALT 30 11/08/2023 01:00 AM    AST 29 11/08/2023 01:00 AM       No results found for: \"CHOL\", \"CHOLPOCT\", \"CHOLX\", \"CHLST\", \"CHOLV\", \"TOTCHOLEXT\", \"HDL\", \"HDLPOC\", \"HDLEXT\", \"HDLC\", \"LDL\", \"LDLCEXT\", \"LDLC\", \"VLDLC\", \"VLDL\", \"TGLX\", \"TRIGL\", \"TRIGLYCEXT\"    No results

## 2024-01-08 ENCOUNTER — TELEPHONE (OUTPATIENT)
Age: 54
End: 2024-01-08

## 2024-01-08 NOTE — TELEPHONE ENCOUNTER
New Patient referred by Dr Linares.  Patient was given the address to [facility] and directions to clinic.  Patient was advised to arrive 15 minutes early for registration, bring medication bottles, as well as insurance cards and ID. Patient was provided the clinic phone number for any questions, in addition to my extension. Patient is agreeable and understanding of all instructions.     Vicki Robertson CMA

## 2024-01-19 ENCOUNTER — TELEPHONE (OUTPATIENT)
Age: 54
End: 2024-01-19

## 2024-01-19 NOTE — TELEPHONE ENCOUNTER
Received message from PSR that pt. Has high balance. Contacted pt. To discuss resources prior to appmnt. Left VM.

## 2024-01-25 ENCOUNTER — TELEPHONE (OUTPATIENT)
Age: 54
End: 2024-01-25

## 2024-01-25 NOTE — TELEPHONE ENCOUNTER
Called patient to reschedule her new patient appointment. Pt states that when she arrived she could not find parking. I advised her that there is  parking available, and she should call us ahead of time if she needs assistance coming upstairs. Patient agrees and is understandable to plan.    Vicki Robertson, CMA

## 2024-02-04 ENCOUNTER — PATIENT MESSAGE (OUTPATIENT)
Age: 54
End: 2024-02-04

## 2024-02-05 NOTE — TELEPHONE ENCOUNTER
Call placed to patient.  No answer and left HIPAA approved VM to return call.     When returns the call will convey per Caryn Allan NP to obtain labs entered by [facility] Dr. Borden.  If itching please see primary care physician for suspicion of Cellulitis.

## 2024-02-07 ENCOUNTER — PATIENT MESSAGE (OUTPATIENT)
Age: 54
End: 2024-02-07

## 2024-02-08 RX ORDER — FUROSEMIDE 40 MG/1
40 TABLET ORAL DAILY
Qty: 90 TABLET | Refills: 3 | Status: SHIPPED | OUTPATIENT
Start: 2024-02-08

## 2024-02-09 ENCOUNTER — TELEPHONE (OUTPATIENT)
Age: 54
End: 2024-02-09

## 2024-02-09 NOTE — TELEPHONE ENCOUNTER
Samantha has received the Medical Request Extension for disability short term disability form. Samantha still needs to have another form filled out, ADA for her job protective leave. She is faxing the form to [phone].     Just an FYI to be on the lookout for that form.    Samantha phone - [phone] Ext. [phone]

## 2024-02-12 NOTE — TELEPHONE ENCOUNTER
Spoke with Lorri with [employer] and documents were received on Friday. Please allow up to 5 business days for the agent to respond to the patient.

## 2024-02-14 ENCOUNTER — TELEPHONE (OUTPATIENT)
Age: 54
End: 2024-02-14

## 2024-02-15 NOTE — PROGRESS NOTES
[facility]  [facility] in [place], [place]  Pulmonary Hypertension Outpatient Clinic Note    Patient name: Radha Mata  Patient : 1970  Patient MRN: [id number]  Date of service: 24    Primary care physician: No primary care provider on file.  Primary cardiologist: Veronique Linares MD  Primary F cardiologist: Ramiro Borden MD  Primary Pulmonologist: Amina Brower MD    CHIEF COMPLAINT:  New referral for pulmonary hypertension    ASSESSMENT:  Radha Mata is a 53 y.o. female with WHO group 1 pulmonary hypertension, functional class IV, high risk hemodynamics.     PLAN:  Continue current medical therapy for Pulmonary Hypertension:  PDE5i: Start Sildenafil 20 mg TID  ERA: Start Opsumit 10 mg daily  Prostacyclin: Given high risk hemodynamic findings and advanced symptoms, parenteral prostacyclin therapy would be best option for treatment. We discussed parenteral therapy, risks/benefits. Patient has no social support, anxiety and inconsistent follow up. She does not think she would be able to manage parenteral therapy. Will plan close follow up on dual therapy and add oral prostacyclin therapy next visit.   Diuretic: She has not used diuretics since last hospitalization despite being prescribed. Asked patient today to start Lasix 40 mg daily  Continue chronic oxygen therapy: Re enforced need for continuous oxygen and avoiding hypoxia. Discussed worsening PAH in setting of hypoxia. She did not bring portable oxygen to clinic visit today.   Routine labs: CMP, pro-NT-BNP, TSH  Additional labs: HIV, hepatitis panel, CTD panel  6 minute walk-stopped test early at 41 m  Recommend sleep study with pulmonary  Schedule V/Q scan   PFTs scheduled 24. Will request results once completed  Reinforced low salt diet  Reinforced fluid restriction to 6 x 8oz glasses per day    [facility] [employer] forms on file  Follow-up with pulmonologist  Recommend flu, covid and pneumonia

## 2024-02-19 ENCOUNTER — OFFICE VISIT (OUTPATIENT)
Age: 54
End: 2024-02-19

## 2024-02-19 VITALS
TEMPERATURE: 97.8 F | DIASTOLIC BLOOD PRESSURE: 98 MMHG | SYSTOLIC BLOOD PRESSURE: 114 MMHG | WEIGHT: 175 LBS | HEART RATE: 101 BPM | HEIGHT: 64 IN | RESPIRATION RATE: 18 BRPM | BODY MASS INDEX: 29.88 KG/M2 | OXYGEN SATURATION: 98 %

## 2024-02-19 DIAGNOSIS — I27.20 PULMONARY HYPERTENSION (HCC): Primary | ICD-10-CM

## 2024-02-19 DIAGNOSIS — I27.20 PULMONARY HYPERTENSION (HCC): ICD-10-CM

## 2024-02-19 DIAGNOSIS — R06.02 SHORTNESS OF BREATH: ICD-10-CM

## 2024-02-19 LAB
DISTANCE WALKED: NORMAL
SPO2: NORMAL

## 2024-02-19 RX ORDER — SILDENAFIL CITRATE 20 MG/1
20 TABLET ORAL 3 TIMES DAILY
Qty: 270 TABLET | Refills: 1 | Status: SHIPPED | OUTPATIENT
Start: 2024-02-19

## 2024-02-19 ASSESSMENT — PATIENT HEALTH QUESTIONNAIRE - PHQ9
5. POOR APPETITE OR OVEREATING: 3
9. THOUGHTS THAT YOU WOULD BE BETTER OFF DEAD, OR OF HURTING YOURSELF: 1
8. MOVING OR SPEAKING SO SLOWLY THAT OTHER PEOPLE COULD HAVE NOTICED. OR THE OPPOSITE, BEING SO FIGETY OR RESTLESS THAT YOU HAVE BEEN MOVING AROUND A LOT MORE THAN USUAL: 1
SUM OF ALL RESPONSES TO PHQ QUESTIONS 1-9: 17
SUM OF ALL RESPONSES TO PHQ QUESTIONS 1-9: 18
SUM OF ALL RESPONSES TO PHQ QUESTIONS 1-9: 18
3. TROUBLE FALLING OR STAYING ASLEEP: 1
SUM OF ALL RESPONSES TO PHQ QUESTIONS 1-9: 18
6. FEELING BAD ABOUT YOURSELF - OR THAT YOU ARE A FAILURE OR HAVE LET YOURSELF OR YOUR FAMILY DOWN: 3
7. TROUBLE CONCENTRATING ON THINGS, SUCH AS READING THE NEWSPAPER OR WATCHING TELEVISION: 0
SUM OF ALL RESPONSES TO PHQ9 QUESTIONS 1 & 2: 6
2. FEELING DOWN, DEPRESSED OR HOPELESS: 3
1. LITTLE INTEREST OR PLEASURE IN DOING THINGS: 3
10. IF YOU CHECKED OFF ANY PROBLEMS, HOW DIFFICULT HAVE THESE PROBLEMS MADE IT FOR YOU TO DO YOUR WORK, TAKE CARE OF THINGS AT HOME, OR GET ALONG WITH OTHER PEOPLE: 3
4. FEELING TIRED OR HAVING LITTLE ENERGY: 3

## 2024-02-19 NOTE — PATIENT INSTRUCTIONS
Medication changes:    Sildenafil 20mg sent to pharmacy (prior auth likely needed should be available within 3 days)  See added information regarding medication    Start on Opsumit 10mg once a day-- this will take longer to get approved as it is a specialty medication, you have completed paperwork in office today. We will keep you updated on process.    Please take this to your pharmacy to notify them of the change in medications.     Testing Ordered:    6 min walk today in clinic    Lab work has been ordered. Please present to a [facility] location of your choice with the orders provided to have lab work done. If results are normal you will just receive a message through my chart. Please make sure to have an active my chart account. Having issues logging in? Contact the [facility] Help Desk at [phone].Our office will notify you of any abnormal results requiring changes to your current plan of care.     An order VQ scan has been placed to be done as soon as able. You will be receiving an automated call from Coordination of Care to schedule this test. If you are unavailable to receive the call or would like to contact coordination of care yourself you may contact [phone] to schedule. You will need to contact coordination of care yourself if you miss their calls as they will only make 3 attempts to reach you.        Other Recommendations:     Follow up in 6 weeks with [facility]    Thank you for allowing us the privilege of being a part of your healthcare team! Please do not hesitate to contact our office at [phone] option 2 with any questions or concerns.

## 2024-02-22 ENCOUNTER — TELEPHONE (OUTPATIENT)
Age: 54
End: 2024-02-22

## 2024-02-22 NOTE — TELEPHONE ENCOUNTER
PA approved through [employer] through 2/20/25  Called [facility] pharmacy to alert of approval--20 dollar co pay  Called patient to alert of above, LM for patient, sent my chart message.

## 2024-02-29 ENCOUNTER — TELEPHONE (OUTPATIENT)
Age: 54
End: 2024-02-29

## 2024-02-29 NOTE — TELEPHONE ENCOUNTER
----- Message from Charli Og RN sent at 2/20/2024 10:01 AM EST -----  Request PFT results from today 2/29-- from [facility]

## 2024-03-11 ENCOUNTER — TELEPHONE (OUTPATIENT)
Age: 54
End: 2024-03-11

## 2024-03-11 NOTE — TELEPHONE ENCOUNTER
Called patient due to unread [employer] message below:  LM for call back or for patient to check my chart    VQ scan  From  Charli Og RN To  Radha Mata Sent and Delivered  2/23/2024  4:26 PM       It looks like the VQ scan was cancelled, is that correct your did you go to have it, and maybe results are still pending. If it was cancelled can you please call [phone] to reschedule, as soon as possible, and let me know when rescheduled for.     Thanks,  OLEG Russell      [employer] [place]    [employer] User Last Read On   Radha Mata Not Read

## 2024-03-21 ENCOUNTER — TELEPHONE (OUTPATIENT)
Age: 54
End: 2024-03-21

## 2024-03-21 NOTE — TELEPHONE ENCOUNTER
KORIN for pt. To call and R/S her No Show Appt. That was on 03/21/2024.     Please R/S Echo and Dr. Linares Appt, Same Day when Pt. Calls back.       Thank you!

## 2024-03-27 ENCOUNTER — TELEPHONE (OUTPATIENT)
Age: 54
End: 2024-03-27

## 2024-05-24 ENCOUNTER — TELEPHONE (OUTPATIENT)
Age: 54
End: 2024-05-24

## (undated) DEVICE — HEART CATH-SFMC: Brand: MEDLINE INDUSTRIES, INC.

## (undated) DEVICE — PINNACLE INTRODUCER SHEATH: Brand: PINNACLE

## (undated) DEVICE — CATHETER ART THERMODILUTION 6 FRX110 CM 4 LUMEN SWAN

## (undated) DEVICE — MICROPUNCTURE INTRODUCER SET SILHOUETTE TRANSITIONLESS WITH NITINOL WIRE GUIDE: Brand: MICROPUNCTURE

## (undated) DEVICE — TUBING PRSS MON L12IN PVC RIG NONEXPANDING M TO FEM CONN